# Patient Record
Sex: MALE | Race: WHITE | Employment: OTHER | URBAN - METROPOLITAN AREA
[De-identification: names, ages, dates, MRNs, and addresses within clinical notes are randomized per-mention and may not be internally consistent; named-entity substitution may affect disease eponyms.]

---

## 2020-01-10 PROBLEM — R09.02 HYPOXIA: Status: ACTIVE | Noted: 2020-01-10

## 2023-10-06 ENCOUNTER — PREP FOR PROCEDURE (OUTPATIENT)
Dept: DENTISTRY | Age: 53
End: 2023-10-06

## 2023-10-06 RX ORDER — SODIUM CHLORIDE 9 MG/ML
INJECTION, SOLUTION INTRAVENOUS PRN
Status: CANCELLED | OUTPATIENT
Start: 2023-10-06

## 2023-10-06 RX ORDER — SODIUM CHLORIDE 0.9 % (FLUSH) 0.9 %
5-40 SYRINGE (ML) INJECTION PRN
Status: CANCELLED | OUTPATIENT
Start: 2023-10-06

## 2023-10-06 RX ORDER — SODIUM CHLORIDE, SODIUM LACTATE, POTASSIUM CHLORIDE, CALCIUM CHLORIDE 600; 310; 30; 20 MG/100ML; MG/100ML; MG/100ML; MG/100ML
INJECTION, SOLUTION INTRAVENOUS CONTINUOUS
Status: CANCELLED | OUTPATIENT
Start: 2023-10-06

## 2023-10-06 RX ORDER — SODIUM CHLORIDE 0.9 % (FLUSH) 0.9 %
5-40 SYRINGE (ML) INJECTION EVERY 12 HOURS SCHEDULED
Status: CANCELLED | OUTPATIENT
Start: 2023-10-06

## 2023-10-19 NOTE — PROGRESS NOTES
I called Romana Edwards, she provided her email for me to send the order for the EKG to be done. She states that [employer] comes to the house. I explained we need the actual tracing, not only a written report. Romana Edwards email: [email or web address]    I left a voicemail with Dr. Sofia Cerda- he did not provide instructions for Eliquis and surgery 11/3.

## 2023-10-23 ENCOUNTER — LAB REQUISITION (OUTPATIENT)
Dept: LAB | Facility: HOSPITAL | Age: 53
End: 2023-10-23
Payer: MEDICARE

## 2023-10-23 DIAGNOSIS — K76.9 LIVER DISEASE, UNSPECIFIED: ICD-10-CM

## 2023-10-23 DIAGNOSIS — E72.20 DISORDER OF UREA CYCLE METABOLISM, UNSPECIFIED (MULTI): ICD-10-CM

## 2023-10-23 DIAGNOSIS — R73.09 OTHER ABNORMAL GLUCOSE: ICD-10-CM

## 2023-10-23 DIAGNOSIS — Z51.81 ENCOUNTER FOR THERAPEUTIC DRUG LEVEL MONITORING: ICD-10-CM

## 2023-10-23 DIAGNOSIS — Z79.899 OTHER LONG TERM (CURRENT) DRUG THERAPY: ICD-10-CM

## 2023-10-23 LAB
ALBUMIN SERPL BCP-MCNC: 3.7 G/DL (ref 3.4–5)
ALP SERPL-CCNC: 76 U/L (ref 33–120)
ALT SERPL W P-5'-P-CCNC: 16 U/L (ref 10–52)
AMMONIA PLAS-SCNC: 91 UMOL/L (ref 16–53)
AST SERPL W P-5'-P-CCNC: 15 U/L (ref 9–39)
BILIRUB DIRECT SERPL-MCNC: 0.1 MG/DL (ref 0–0.3)
BILIRUB SERPL-MCNC: 0.2 MG/DL (ref 0–1.2)
EST. AVERAGE GLUCOSE BLD GHB EST-MCNC: 143 MG/DL
HBA1C MFR BLD: 6.6 %
PROT SERPL-MCNC: 6.2 G/DL (ref 6.4–8.2)
VALPROATE SERPL-MCNC: 78 UG/ML (ref 50–100)

## 2023-10-23 PROCEDURE — 82140 ASSAY OF AMMONIA: CPT | Mod: OUT | Performed by: INTERNAL MEDICINE

## 2023-10-23 PROCEDURE — 83036 HEMOGLOBIN GLYCOSYLATED A1C: CPT | Mod: OUT | Performed by: INTERNAL MEDICINE

## 2023-10-23 PROCEDURE — 80076 HEPATIC FUNCTION PANEL: CPT | Mod: OUT | Performed by: INTERNAL MEDICINE

## 2023-10-23 PROCEDURE — 80164 ASSAY DIPROPYLACETIC ACD TOT: CPT | Mod: OUT | Performed by: INTERNAL MEDICINE

## 2023-10-31 RX ORDER — CLOTRIMAZOLE 1 %
CREAM (GRAM) TOPICAL 3 TIMES DAILY
COMMUNITY

## 2023-10-31 RX ORDER — LACTULOSE 10 G/15ML
30 SOLUTION ORAL; RECTAL 2 TIMES DAILY
COMMUNITY

## 2023-10-31 RX ORDER — HYDROXYZINE PAMOATE 50 MG/1
50 CAPSULE ORAL NIGHTLY
COMMUNITY

## 2023-10-31 RX ORDER — INSULIN GLARGINE 100 [IU]/ML
12 INJECTION, SOLUTION SUBCUTANEOUS DAILY
COMMUNITY

## 2023-10-31 RX ORDER — LEVOTHYROXINE SODIUM 0.1 MG/1
100 TABLET ORAL DAILY
COMMUNITY

## 2023-11-01 NOTE — H&P (VIEW-ONLY)
Dental History and Physical    Alfonse Cockayne    [address] [place] [address] Campos Johnson    The patient is a 48 y.o. male     Chief Complaint: Severe attrition and sharp teeth. 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[address])     Deaf     Diabetes type 2, controlled ([address])     Diastolic heart failure ([address])     Headache     Hypertension     Moderate intellectual disability     Nonverbal     Obesity     Phobia of dental procedure     Sexual behavior disorder     Shingles     Sinus problem     Sleep apnea     refuses to wear bi-pap    Snoring        Past Surgical History:        Procedure Laterality Date    NASAL POLYP SURGERY      OTHER SURGICAL HISTORY  01/10/2020    restorative dentistry        Medications Prior to Admission:    Prior to Admission medications    Medication Sig Start Date End Date Taking?  Authorizing Provider   clotrimazole (LOTRIMIN) 1 % cream Apply topically 3 times daily FEET    Loreta Parker MD   hydrOXYzine pamoate (VISTARIL) 50 MG capsule Take 1 capsule by mouth nightly    Loreta Parker MD   empagliflozin (JARDIANCE) 10 MG tablet Take 1 tablet by mouth daily    Loreta Parker MD   lactulose encephalopathy 10 GM/15ML SOLN solution Take 45 mLs by mouth 2 times daily    Loreta Parker MD   insulin glargine (LANTUS) 100 UNIT/ML injection vial Inject 12 Units into the skin daily    Loreta Parker MD   levothyroxine (SYNTHROID) 100 MCG tablet Take 1 tablet by mouth Daily    Loreta Parker MD   carbamide peroxide (DEBROX) 6.5 % otic solution Place 2 drops into both ears as needed    Loreta Parker MD MD Loreta   vitamin B-12 (CYANOCOBALAMIN) 1000 MCG tablet Take 1,000 mcg by mouth daily    Loreta Parker MD   Ergocalciferol (VITAMIN D2 PO) Take 50,000 Units by mouth Indications: every thursday    Loreta Parker MD   acetaminophen (TYLENOL) 650 MG extended release tablet Take 650 mg by mouth every 4 hours as needed for Pain or Fever    Loreta Parker MD   polyvinyl alcohol (LIQUIFILM TEARS) 1.4 % ophthalmic solution Place 1 drop into both eyes as needed    Loreta Parker MD   bisacodyl (DULCOLAX) 10 MG suppository Place 10 mg rectally Indications: every four days prn    Loreta Parker MD   Sodium Phosphates (FLEET) 7-19 GM/118ML Place 1 enema rectally once as needed    Loreta Parker MD   fluticasone (FLONASE) 50 MCG/ACT nasal spray 1 spray by Each Nostril route 2 times daily as needed for Rhinitis    Loreta Parker MD   ipratropium-albuterol (DUONEB) 0.5-2.5 (3) MG/3ML SOLN nebulizer solution Inhale 1 vial into the lungs every 6 hours as needed for Shortness of Breath    Loreta Parker MD   aluminum & magnesium hydroxide-simethicone (MAALOX) 200-200-20 MG/5ML SUSP suspension Take 30 mLs by mouth 2 times daily as needed for Indigestion    ProviderLoreta MD       Allergies:  Patient has no known allergies. Social History:   TOBACCO:   has no history on file for tobacco use. ETOH:   has no history on file for alcohol use. OCCUPATION:  unknown    Family History:   No family history on file.     REVIEW OF SYSTEMS:  Completed by Dr Nacho Pierre on 10/17/2023    Labs and Imaging Studies   Basic Labs  CBC with Differential:    Lab Results   Component Value Date/Time    WBC 9.4 01/11/2020 06:32 AM    RBC 4.35 01/11/2020 06:32 AM    HGB 14.9 01/11/2020 06:32 AM    HCT 44.1 01/11/2020 06:32 AM     01/11/2020 06:32 AM    .4 01/11/2020 06:32 AM    MCH 34.2 01/11/2020 06:32 AM    MCHC 33.7 01/11/2020 06:32 AM    RDW 14.0 01/11/2020 06:32 AM

## 2023-11-01 NOTE — PROGRESS NOTES
[address]   PRE-ADMISSION TESTING GENERAL INSTRUCTIONS  [facility] Phone Number: [phone]      GENERAL INSTRUCTIONS: DENTAL RESTORATIONS    [x] Antibacterial Soap Shower Night before and/or AM of surgery. [x] Do not wear makeup, lotions, powders, deodorant. [x] Nothing by mouth after midnight; including gum, candy, mints, or water. [x] You may brush your teeth, gargle, but do NOT swallow water. [x] No tobacco products, illegal drugs, or alcohol within 24 hours of your surgery. [x] Jewelry or valuables should not be brought to the hospital. All body and/or tongue piercing's must be removed prior to arriving to hospital. No contact lens or hair pins. [x] Bring insurance card and photo ID. PARKING INSTRUCTIONS:     [x] ARRIVAL DATE & TIME: NOV 3 RD AT 6 AM  [x] Times are subject to change. We will contact you the business day before surgery if that were to occur. [x] Enter into the [employer]. Two people may accompany you. Masks are not required. [x] Parking Lot \"I\" is where you will park. It is located on the corner of [address] [place] [address] and [address]. The entrance is on [address]. Only one vehicle - per patient, is permitted in parking lot. Upon entering the parking lot, a voucher ticket will print. MEDICATION INSTRUCTIONS:    [x] Bring a complete list of your medications, please write the last time you took the medicine, give this list to the nurse in Pre-Op. [x] Take only the following medications the morning of surgery with 1-2 ounces of water: DEPAKOTE, TOPAMAX, ABILIFY, THIORIDAZINE    HOLD JARDIANCE, LAST DOSE 10/31  [x] Stop all herbal supplements and vitamins 5 days before surgery. Stop NSAIDS 7 days before surgery. [x] DO NOT take any diabetic medicine the morning of surgery. Follow instructions for insulin the day before surgery.   [x] If you are diabetic and your blood sugar is low or you feel symptomatic, you may drink 1-2 ounces of apple juice or take a glucose tablet.            -The morning of your procedure, you may call the pre-op area if you have concerns about your blood sugar [phone]. [x] Follow physician instructions regarding any blood thinners you may be taking. WHAT TO EXPECT:    [x] The day of surgery you will be greeted and checked in by the [employer].  In addition, you will be registered in the [facility] by a Patient Access Representative. Please bring your photo ID and insurance card. A nurse will greet you in accordance to the time you are needed in the pre-op area to prepare you for surgery. Please do not be discouraged if you are not greeted in the order you arrive as there are many variables that are involved in patient preparation. Your patience is greatly appreciated as you wait for your nurse. [x]  Delays may occur with surgery and staff will make a sincere effort to keep you informed of delays. If any delays occur with your procedure, we apologize ahead of time for your inconvenience as we recognize the value of your time.

## 2023-11-01 NOTE — H&P
Dental History and Physical    [facility]    [address] [place] [address] Campos Johnson    The patient is a 48 y.o. male     Chief Complaint: Severe attrition and sharp teeth. 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[address])     Deaf     Diabetes type 2, controlled ([address])     Diastolic heart failure ([address])     Headache     Hypertension     Moderate intellectual disability     Nonverbal     Obesity     Phobia of dental procedure     Sexual behavior disorder     Shingles     Sinus problem     Sleep apnea     refuses to wear bi-pap    Snoring        Past Surgical History:        Procedure Laterality Date    NASAL POLYP SURGERY      OTHER SURGICAL HISTORY  01/10/2020    restorative dentistry        Medications Prior to Admission:    Prior to Admission medications    Medication Sig Start Date End Date Taking?  Authorizing Provider   clotrimazole (LOTRIMIN) 1 % cream Apply topically 3 times daily FEET    ProviderLoreta MD   hydrOXYzine pamoate (VISTARIL) 50 MG capsule Take 1 capsule by mouth nightly    Loreta Parker MD   empagliflozin (JARDIANCE) 10 MG tablet Take 1 tablet by mouth daily    Loreta Parker MD   lactulose encephalopathy 10 GM/15ML SOLN solution Take 45 mLs by mouth 2 times daily    Loreta Parker MD   insulin glargine (LANTUS) 100 UNIT/ML injection vial Inject 12 Units into the skin daily    Loreta Parker MD   levothyroxine (SYNTHROID) 100 MCG tablet Take 1 tablet by mouth Daily    Loreta Parker MD   carbamide peroxide (DEBROX) 6.5 % otic solution Place 2 drops into both ears as needed    Loreta Parker MD

## 2023-11-03 ENCOUNTER — ANESTHESIA EVENT (OUTPATIENT)
Dept: OPERATING ROOM | Age: 53
End: 2023-11-03
Payer: MEDICARE

## 2023-11-03 ENCOUNTER — ANESTHESIA (OUTPATIENT)
Dept: OPERATING ROOM | Age: 53
End: 2023-11-03
Payer: MEDICARE

## 2023-11-03 ENCOUNTER — HOSPITAL ENCOUNTER (OUTPATIENT)
Age: 53
Setting detail: OUTPATIENT SURGERY
Discharge: HOME | End: 2023-11-03
Attending: DENTIST | Admitting: DENTIST
Payer: MEDICARE

## 2023-11-03 VITALS
OXYGEN SATURATION: 93 % | SYSTOLIC BLOOD PRESSURE: 127 MMHG | DIASTOLIC BLOOD PRESSURE: 63 MMHG | HEIGHT: 66 IN | BODY MASS INDEX: 30.37 KG/M2 | TEMPERATURE: 97.1 F | RESPIRATION RATE: 10 BRPM | HEART RATE: 79 BPM | WEIGHT: 189 LBS

## 2023-11-03 DIAGNOSIS — Z01.818 PRE-OP TESTING: Primary | ICD-10-CM

## 2023-11-03 PROBLEM — K05.6 PERIODONTAL DISEASE: Status: ACTIVE | Noted: 2023-11-03

## 2023-11-03 LAB
ANION GAP SERPL CALCULATED.3IONS-SCNC: 10 MMOL/L (ref 7–16)
BUN SERPL-MCNC: 11 MG/DL (ref 6–20)
CALCIUM SERPL-MCNC: 9.4 MG/DL (ref 8.6–10.2)
CHLORIDE SERPL-SCNC: 106 MMOL/L (ref 98–107)
CO2 SERPL-SCNC: 29 MMOL/L (ref 22–29)
CREAT SERPL-MCNC: 0.6 MG/DL (ref 0.7–1.2)
ERYTHROCYTE [DISTWIDTH] IN BLOOD BY AUTOMATED COUNT: 15.1 % (ref 11.5–15)
GFR SERPL CREATININE-BSD FRML MDRD: >60 ML/MIN/1.73M2
GLUCOSE BLD-MCNC: 94 MG/DL (ref 74–99)
GLUCOSE SERPL-MCNC: 90 MG/DL (ref 74–99)
HCT VFR BLD AUTO: 45.4 % (ref 37–54)
HGB BLD-MCNC: 14.4 G/DL (ref 12.5–16.5)
MCH RBC QN AUTO: 30.8 PG (ref 26–35)
MCHC RBC AUTO-ENTMCNC: 31.7 G/DL (ref 32–34.5)
MCV RBC AUTO: 97 FL (ref 80–99.9)
PLATELET # BLD AUTO: 191 K/UL (ref 130–450)
PMV BLD AUTO: 10.4 FL (ref 7–12)
POTASSIUM SERPL-SCNC: 3.8 MMOL/L (ref 3.5–5)
RBC # BLD AUTO: 4.68 M/UL (ref 3.8–5.8)
SODIUM SERPL-SCNC: 145 MMOL/L (ref 132–146)
WBC OTHER # BLD: 10.1 K/UL (ref 4.5–11.5)

## 2023-11-03 PROCEDURE — 3700000001 HC ADD 15 MINUTES (ANESTHESIA): Performed by: DENTIST

## 2023-11-03 PROCEDURE — 80048 BASIC METABOLIC PNL TOTAL CA: CPT

## 2023-11-03 PROCEDURE — 82962 GLUCOSE BLOOD TEST: CPT

## 2023-11-03 PROCEDURE — 7100000001 HC PACU RECOVERY - ADDTL 15 MIN: Performed by: DENTIST

## 2023-11-03 PROCEDURE — 2580000003 HC RX 258: Performed by: DENTIST

## 2023-11-03 PROCEDURE — 85027 COMPLETE CBC AUTOMATED: CPT

## 2023-11-03 PROCEDURE — 2500000003 HC RX 250 WO HCPCS: Performed by: ANESTHESIOLOGIST ASSISTANT

## 2023-11-03 PROCEDURE — 7100000010 HC PHASE II RECOVERY - FIRST 15 MIN: Performed by: DENTIST

## 2023-11-03 PROCEDURE — 6370000000 HC RX 637 (ALT 250 FOR IP): Performed by: DENTIST

## 2023-11-03 PROCEDURE — 2500000003 HC RX 250 WO HCPCS: Performed by: DENTIST

## 2023-11-03 PROCEDURE — 7100000011 HC PHASE II RECOVERY - ADDTL 15 MIN: Performed by: DENTIST

## 2023-11-03 PROCEDURE — 3700000000 HC ANESTHESIA ATTENDED CARE: Performed by: DENTIST

## 2023-11-03 PROCEDURE — 2709999900 HC NON-CHARGEABLE SUPPLY: Performed by: DENTIST

## 2023-11-03 PROCEDURE — 3600000013 HC SURGERY LEVEL 3 ADDTL 15MIN: Performed by: DENTIST

## 2023-11-03 PROCEDURE — 7100000000 HC PACU RECOVERY - FIRST 15 MIN: Performed by: DENTIST

## 2023-11-03 PROCEDURE — 3600000003 HC SURGERY LEVEL 3 BASE: Performed by: DENTIST

## 2023-11-03 PROCEDURE — 6370000000 HC RX 637 (ALT 250 FOR IP): Performed by: ANESTHESIOLOGIST ASSISTANT

## 2023-11-03 PROCEDURE — 6360000002 HC RX W HCPCS: Performed by: ANESTHESIOLOGIST ASSISTANT

## 2023-11-03 RX ORDER — LIDOCAINE HYDROCHLORIDE 20 MG/ML
INJECTION, SOLUTION INTRAVENOUS PRN
Status: DISCONTINUED | OUTPATIENT
Start: 2023-11-03 | End: 2023-11-03 | Stop reason: SDUPTHER

## 2023-11-03 RX ORDER — DEXAMETHASONE SODIUM PHOSPHATE 10 MG/ML
INJECTION INTRAMUSCULAR; INTRAVENOUS PRN
Status: DISCONTINUED | OUTPATIENT
Start: 2023-11-03 | End: 2023-11-03 | Stop reason: SDUPTHER

## 2023-11-03 RX ORDER — ONDANSETRON 2 MG/ML
4 INJECTION INTRAMUSCULAR; INTRAVENOUS
Status: DISCONTINUED | OUTPATIENT
Start: 2023-11-03 | End: 2023-11-03 | Stop reason: HOSPADM

## 2023-11-03 RX ORDER — MIDAZOLAM HYDROCHLORIDE 1 MG/ML
INJECTION INTRAMUSCULAR; INTRAVENOUS PRN
Status: DISCONTINUED | OUTPATIENT
Start: 2023-11-03 | End: 2023-11-03 | Stop reason: SDUPTHER

## 2023-11-03 RX ORDER — SODIUM CHLORIDE 0.9 % (FLUSH) 0.9 %
5-40 SYRINGE (ML) INJECTION EVERY 12 HOURS SCHEDULED
Status: DISCONTINUED | OUTPATIENT
Start: 2023-11-03 | End: 2023-11-03 | Stop reason: HOSPADM

## 2023-11-03 RX ORDER — ALBUTEROL SULFATE 90 UG/1
AEROSOL, METERED RESPIRATORY (INHALATION) PRN
Status: DISCONTINUED | OUTPATIENT
Start: 2023-11-03 | End: 2023-11-03 | Stop reason: SDUPTHER

## 2023-11-03 RX ORDER — HYDROMORPHONE HYDROCHLORIDE 1 MG/ML
0.25 INJECTION, SOLUTION INTRAMUSCULAR; INTRAVENOUS; SUBCUTANEOUS EVERY 5 MIN PRN
Status: DISCONTINUED | OUTPATIENT
Start: 2023-11-03 | End: 2023-11-03 | Stop reason: HOSPADM

## 2023-11-03 RX ORDER — ACETAMINOPHEN 325 MG/1
650 TABLET ORAL
Status: DISCONTINUED | OUTPATIENT
Start: 2023-11-03 | End: 2023-11-03 | Stop reason: HOSPADM

## 2023-11-03 RX ORDER — AMOXICILLIN 500 MG/1
500 CAPSULE ORAL 3 TIMES DAILY
Qty: 21 CAPSULE | Refills: 0 | Status: SHIPPED | OUTPATIENT
Start: 2023-11-03 | End: 2023-11-10

## 2023-11-03 RX ORDER — LABETALOL HYDROCHLORIDE 5 MG/ML
5 INJECTION, SOLUTION INTRAVENOUS
Status: DISCONTINUED | OUTPATIENT
Start: 2023-11-03 | End: 2023-11-03 | Stop reason: HOSPADM

## 2023-11-03 RX ORDER — HYDRALAZINE HYDROCHLORIDE 20 MG/ML
5 INJECTION INTRAMUSCULAR; INTRAVENOUS
Status: DISCONTINUED | OUTPATIENT
Start: 2023-11-03 | End: 2023-11-03 | Stop reason: HOSPADM

## 2023-11-03 RX ORDER — MEPERIDINE HYDROCHLORIDE 25 MG/ML
12.5 INJECTION INTRAMUSCULAR; INTRAVENOUS; SUBCUTANEOUS EVERY 5 MIN PRN
Status: DISCONTINUED | OUTPATIENT
Start: 2023-11-03 | End: 2023-11-03 | Stop reason: HOSPADM

## 2023-11-03 RX ORDER — IPRATROPIUM BROMIDE AND ALBUTEROL SULFATE 2.5; .5 MG/3ML; MG/3ML
1 SOLUTION RESPIRATORY (INHALATION)
Status: DISCONTINUED | OUTPATIENT
Start: 2023-11-03 | End: 2023-11-03 | Stop reason: HOSPADM

## 2023-11-03 RX ORDER — SODIUM CHLORIDE 9 MG/ML
INJECTION, SOLUTION INTRAVENOUS PRN
Status: DISCONTINUED | OUTPATIENT
Start: 2023-11-03 | End: 2023-11-03 | Stop reason: HOSPADM

## 2023-11-03 RX ORDER — SODIUM CHLORIDE 0.9 % (FLUSH) 0.9 %
5-40 SYRINGE (ML) INJECTION PRN
Status: DISCONTINUED | OUTPATIENT
Start: 2023-11-03 | End: 2023-11-03 | Stop reason: HOSPADM

## 2023-11-03 RX ORDER — SODIUM CHLORIDE, SODIUM LACTATE, POTASSIUM CHLORIDE, CALCIUM CHLORIDE 600; 310; 30; 20 MG/100ML; MG/100ML; MG/100ML; MG/100ML
INJECTION, SOLUTION INTRAVENOUS CONTINUOUS
Status: DISCONTINUED | OUTPATIENT
Start: 2023-11-03 | End: 2023-11-03 | Stop reason: HOSPADM

## 2023-11-03 RX ORDER — LIDOCAINE HYDROCHLORIDE AND EPINEPHRINE BITARTRATE 20; .01 MG/ML; MG/ML
INJECTION, SOLUTION SUBCUTANEOUS PRN
Status: DISCONTINUED | OUTPATIENT
Start: 2023-11-03 | End: 2023-11-03 | Stop reason: HOSPADM

## 2023-11-03 RX ORDER — ONDANSETRON 2 MG/ML
INJECTION INTRAMUSCULAR; INTRAVENOUS PRN
Status: DISCONTINUED | OUTPATIENT
Start: 2023-11-03 | End: 2023-11-03 | Stop reason: SDUPTHER

## 2023-11-03 RX ORDER — MIDAZOLAM HYDROCHLORIDE 2 MG/2ML
2 INJECTION, SOLUTION INTRAMUSCULAR; INTRAVENOUS
Status: DISCONTINUED | OUTPATIENT
Start: 2023-11-03 | End: 2023-11-03 | Stop reason: HOSPADM

## 2023-11-03 RX ORDER — ROCURONIUM BROMIDE 10 MG/ML
INJECTION, SOLUTION INTRAVENOUS PRN
Status: DISCONTINUED | OUTPATIENT
Start: 2023-11-03 | End: 2023-11-03 | Stop reason: SDUPTHER

## 2023-11-03 RX ORDER — FENTANYL CITRATE 50 UG/ML
INJECTION, SOLUTION INTRAMUSCULAR; INTRAVENOUS PRN
Status: DISCONTINUED | OUTPATIENT
Start: 2023-11-03 | End: 2023-11-03 | Stop reason: SDUPTHER

## 2023-11-03 RX ORDER — HYDROMORPHONE HYDROCHLORIDE 1 MG/ML
0.5 INJECTION, SOLUTION INTRAMUSCULAR; INTRAVENOUS; SUBCUTANEOUS EVERY 5 MIN PRN
Status: DISCONTINUED | OUTPATIENT
Start: 2023-11-03 | End: 2023-11-03 | Stop reason: HOSPADM

## 2023-11-03 RX ORDER — PROPOFOL 10 MG/ML
INJECTION, EMULSION INTRAVENOUS PRN
Status: DISCONTINUED | OUTPATIENT
Start: 2023-11-03 | End: 2023-11-03 | Stop reason: SDUPTHER

## 2023-11-03 RX ORDER — DROPERIDOL 2.5 MG/ML
0.62 INJECTION, SOLUTION INTRAMUSCULAR; INTRAVENOUS
Status: DISCONTINUED | OUTPATIENT
Start: 2023-11-03 | End: 2023-11-03 | Stop reason: HOSPADM

## 2023-11-03 RX ORDER — PHENYLEPHRINE HCL IN 0.9% NACL 1 MG/10 ML
SYRINGE (ML) INTRAVENOUS PRN
Status: DISCONTINUED | OUTPATIENT
Start: 2023-11-03 | End: 2023-11-03 | Stop reason: SDUPTHER

## 2023-11-03 RX ORDER — DIPHENHYDRAMINE HYDROCHLORIDE 50 MG/ML
12.5 INJECTION INTRAMUSCULAR; INTRAVENOUS
Status: DISCONTINUED | OUTPATIENT
Start: 2023-11-03 | End: 2023-11-03 | Stop reason: HOSPADM

## 2023-11-03 RX ORDER — GLYCOPYRROLATE 0.2 MG/ML
INJECTION INTRAMUSCULAR; INTRAVENOUS PRN
Status: DISCONTINUED | OUTPATIENT
Start: 2023-11-03 | End: 2023-11-03 | Stop reason: SDUPTHER

## 2023-11-03 RX ADMIN — GLYCOPYRROLATE 0.2 MG: 1 INJECTION INTRAMUSCULAR; INTRAVENOUS at 08:18

## 2023-11-03 RX ADMIN — SODIUM CHLORIDE: 9 INJECTION, SOLUTION INTRAVENOUS at 08:59

## 2023-11-03 RX ADMIN — ALBUTEROL SULFATE 4 PUFF: 90 AEROSOL, METERED RESPIRATORY (INHALATION) at 09:15

## 2023-11-03 RX ADMIN — PROPOFOL 200 MG: 10 INJECTION, EMULSION INTRAVENOUS at 07:37

## 2023-11-03 RX ADMIN — Medication 100 MCG: at 07:41

## 2023-11-03 RX ADMIN — MIDAZOLAM 1 MG: 1 INJECTION INTRAMUSCULAR; INTRAVENOUS at 07:33

## 2023-11-03 RX ADMIN — DEXAMETHASONE SODIUM PHOSPHATE 10 MG: 10 INJECTION INTRAMUSCULAR; INTRAVENOUS at 07:42

## 2023-11-03 RX ADMIN — ROCURONIUM BROMIDE 50 MG: 10 INJECTION, SOLUTION INTRAVENOUS at 07:37

## 2023-11-03 RX ADMIN — Medication 100 MCG: at 08:18

## 2023-11-03 RX ADMIN — SODIUM CHLORIDE: 9 INJECTION, SOLUTION INTRAVENOUS at 07:27

## 2023-11-03 RX ADMIN — PROPOFOL 50 MG: 10 INJECTION, EMULSION INTRAVENOUS at 08:38

## 2023-11-03 RX ADMIN — FENTANYL CITRATE 100 MCG: 50 INJECTION, SOLUTION INTRAMUSCULAR; INTRAVENOUS at 07:37

## 2023-11-03 RX ADMIN — LIDOCAINE HYDROCHLORIDE 100 MG: 20 INJECTION, SOLUTION INTRAVENOUS at 07:37

## 2023-11-03 RX ADMIN — ONDANSETRON HYDROCHLORIDE 4 MG: 2 SOLUTION INTRAMUSCULAR; INTRAVENOUS at 09:09

## 2023-11-03 RX ADMIN — Medication 100 MCG: at 07:55

## 2023-11-03 RX ADMIN — SUGAMMADEX 172 MG: 100 INJECTION, SOLUTION INTRAVENOUS at 08:58

## 2023-11-03 ASSESSMENT — PAIN - FUNCTIONAL ASSESSMENT: PAIN_FUNCTIONAL_ASSESSMENT: 0-10

## 2023-11-03 NOTE — BRIEF OP NOTE
Brief Postoperative Note      Patient: Bushra Bobo  YOB: 1970  MRN: [id number]    Date of Procedure: 11/3/2023    Pre-Op Diagnosis Codes:     * Dental caries [K02.9]    Post-Op Diagnosis: Same       Procedure(s):  RADIOGRAPHS, ADULT PROPHYLAXIS, FLUORIDE, RESTORATIONS, EXTRACTIONS X3    Surgeon(s):  Salma Maciel DDS; Radha Shepherd DMD    Assistant:      Anesthesia: General ETT    Estimated Blood Loss (mL): less than 50     Complications: None    Specimens:   * No specimens in log *    Implants:  * No implants in log *      Drains: * No LDAs found *    Findings: Clinical and radiographic exam completed. 3 restorations and 3 extractions indicated. Electronically signed by Radha Shepherd DMD on 11/3/2023 at 9:07 AM    I agree with the above.  Electronically signed by Salma Maciel DDS on 11/3/2023 at 1:55 PM

## 2023-11-03 NOTE — ANESTHESIA POSTPROCEDURE EVALUATION
Department of Anesthesiology  Postprocedure Note    Patient: Elinor Kawasaki  MRN: [id number]  YOB: 1970  Date of evaluation: 11/3/2023      Procedure Summary     Date: 11/03/23 Room / Location: [facility] OR 10 / [facility]    Anesthesia Start: 6487 Anesthesia Stop: 7920    Procedure: RADIOGRAPHS, ADULT PROPHYLAXIS, FLUORIDE, RESTORATIONS, EXTRACTIONS X3 (Mouth) Diagnosis:       Dental caries      (Dental caries [K02.9])    Surgeons: Kevyn Jasso DDS Responsible Provider: Sisi Crook MD    Anesthesia Type: general ASA Status: 3          Anesthesia Type: No value filed.     Que Phase I: Que Score: 10    Que Phase II: Que Score: 10      Anesthesia Post Evaluation    Patient location during evaluation: PACU  Patient participation: complete - patient participated  Level of consciousness: awake and alert  Airway patency: patent  Nausea & Vomiting: no nausea and no vomiting  Complications: no  Cardiovascular status: blood pressure returned to baseline and hemodynamically stable  Respiratory status: acceptable and spontaneous ventilation  Hydration status: euvolemic  Multimodal analgesia pain management approach  Pain management: adequate

## 2023-11-03 NOTE — PROGRESS NOTES
Care giver dressing patient. Instructions given to caregiver along with script to take to the pharmacy.

## 2023-11-03 NOTE — ANESTHESIA PRE PROCEDURE
Department of Anesthesiology  Preprocedure Note       Name:  Alfonse Cockayne   Age:  48 y.o.  :  1970                                          MRN:  [id number]         Date:  11/3/2023      Surgeon: Gregg Ribera):  Anirudh Golden DDS    Procedure: Procedure(s):  DENTAL RESTORATIONS (FACILITY)    Medications prior to admission:   Prior to Admission medications    Medication Sig Start Date End Date Taking? Authorizing Provider   clotrimazole (LOTRIMIN) 1 % cream Apply topically 3 times daily FEET   Yes Loreta Parker MD   hydrOXYzine pamoate (VISTARIL) 50 MG capsule Take 1 capsule by mouth nightly   Yes Loreta Parker MD   empagliflozin (JARDIANCE) 10 MG tablet Take 1 tablet by mouth daily   Yes Loreta Parker MD   lactulose encephalopathy 10 GM/15ML SOLN solution Take 45 mLs by mouth 2 times daily   Yes Loreta Parker MD   insulin glargine (LANTUS) 100 UNIT/ML injection vial Inject 12 Units into the skin daily   Yes Loreta Parker MD   levothyroxine (SYNTHROID) 100 MCG tablet Take 1 tablet by mouth Daily   Yes Loreta Parker MD   carbamide peroxide (DEBROX) 6.5 % otic solution Place 2 drops into both ears as needed   Yes Loreta Parker MD   magnesium hydroxide (MILK OF MAGNESIA) 400 MG/5ML suspension Take by mouth every 48 hours as needed for Constipation   Yes Loreta Parker MD   neomycin-bacitracin-polymyxin (NEOSPORIN) 5-400-5000 ointment Apply topically 2 times daily as needed Apply topically 4 times daily.     Loreta Parker MD   Vitamins A & D ([employer] VITAMIN A&D) OINT Apply topically daily as needed    Loreta Parker MD   ARIPiprazole (ABILIFY) 30 MG tablet Take 0.5 tablets by mouth 2 times daily Indications: takes half tablet    Loreta Parker MD   atorvastatin (LIPITOR) 80 MG tablet Take 1 tablet by mouth nightly    Loreta Parker MD   divalproex (DEPAKOTE ER) 250 MG extended release tablet Take 3 tablets by mouth in the

## 2023-11-03 NOTE — DISCHARGE INSTRUCTIONS
Post- Operative Patient  Instructions for [facility]     Please read and follow these instructions carefully. The after effects of oral surgery vary per child, so not all of these instructions may apply. Please feel free to call our clinic any time should you have any questions, or are experiencing any unusual symptoms following your treatment. There is always a dental resident on call after hours that you may reach to discuss your child's care.                                                            Day of Surgery    Immediately after surgery.Patients that have received a general anesthetic should return home from the hospital immediately upon discharge, and lie down with head elevated until all effects of the anesthesia have disappeared. Anesthetic effects vary by individual, and you may feel drowsy for a short period of time or for several hours. Your child should not participate in activities for at least 12 hours or longer if they appear drowsing or tired from the residual effects of the anesthesia.    Do not send your child to school within 24 hours after procedure.    Do not allow your child to participate in activities before 12 hours or longer depending on how your child is feeling.     Watch out for dizziness. Have them walk slowly and take their time. Sudden changes of position can also cause nausea.    Diet: If they feel nauseated or sick to your stomach, drink clear liquids like 7-up, room temperature broth, apple juice, ginger ale, room temperature tea, cola, or eat jello. If these liquids do not make you sick to their stomach, try eating soft foods like mashed potatoes, scrambled eggs, and cereal.    6)  Discuss any questions you may have with the dental clinic at [phone] during    office hours or [phone] on weekends and evening.                                                                                                      Oral Hygiene and Care    Do not disturb  especially if crowns were placed. Recurrent or New cavities may develop sooner after if the above directions are not followed and the diet and hygiene are not changed                                                                Follow Up   It is our desire that your recovery be as smooth as possible and as pleasant as possible.  If you have any questions about your progress or any symptoms, please call the dental clinic during office hours at [phone] or at the after hours number [phone].      1- Week Follow-up Appointment at the Dental Clinic:   11/10/2023 at [address] [facility] Vi Hsu, DMD

## 2023-11-03 NOTE — INTERVAL H&P NOTE
Update History & Physical    The patient's History and Physical of October 17, 2023 was reviewed with the patient and caregiver, and I examined the patient. There was no change. The surgical site was confirmed by the patient, caregiver, and me. Plan: The risks, benefits, expected outcome, and alternative to the recommended procedure have been discussed with the guardian. Consents obtained from guardian for procedure.    Electronically signed by Irvin Dasilva DMD on 11/3/2023 at 7:23 AM

## 2023-11-03 NOTE — OP NOTE
Date of Procedure: 11/3/2023     Surgeon: Ba Carter DDS; Sherry Lebron DMD      Surgical Wound Classification: Clean Contaminated II    Preoperative Diagnosis: dental caries      Postoperative Diagnosis: dental caries, periodontal disease    Operation: Exam, Prophy, Fluoride Treatment, Restorative:3 Extractions: 3    Anesthesia: General ETT    Estimated Blood Loss: less then 17UY    Complications:  none    Fluids: 1000 mL    Specimen: none    Conditions:  good    Disposition: To PACU, stable    Procedure: This patient was initially seen in the preoperative holding area, where the history, physical and consents were updated and verified. The patient was then transferred via the anesthesia team and [facility] to operating room # 10 at [facility] on 11/3/2023 , at which time the patient was transferred from the [facility] onto the operating room table and placed in the supine position. The patient's arms and legs were padded at the sides. The patient had the general anesthetic monitors applied. Please see anesthesia notes for complete details. Once the patient was placed into a general anesthetic state, the surgical area was prepped and draped in a standard oral and maxillofacial surgery fashion. A throat pack was placed. A comprehensive oral exam was performed. 12 radiographs were taken. A treatment plan was formulated based upon presenting clinical and radiographic findings. Caries were identified, followed by the preparation of the following teeth: #6 DI, #8DLM, #9 ML. Dental restorations were placed as follows: #6 DI, #8DLM, and #9 ML all composite resin restorations. Dental restorations were polished and refined to proper occlusion. Full mouth scaling and root planing completed with cavitron and hand scalers. Surgical procedure was initiated.   Using 1.5 length 27-gauge needle, and 3.4 mL of 2% lidocaine with 1:100,000 epinephrine was administered Utilizing proper surgical instruments and techniques, the following teeth were removed:  #14,15,27. Teeth were removed due to caries, fractures, or mobility supported by clinical and radiographic evidence. All teeth removed were non-restorable. Extraction sites were copiously irrigated with normal saline, and felt to be smooth by finger touch. Bovie used. Extractions sites were closed with 3.0 chromic on a tapered PS-2 needle. Hemostasis achieved. One final round of copious irrigation with suction was completed and it was verified again that primary closure was achieved in all areas. Throat pack was removed. Patient was awake from anesthesia. Patient was released to recovery room in good condition. Patient tolerated procedure well. Postoperative instructions given to caregiver. Due to state of periodontal disease, and multiple fractures, recommend 2 year recall for OR dental. At that appointment recommend extracting all 13 remaining teeth. The following prescriptions were given: (1) amoxicillin 500mg  No refills on either prescription. 1-Week Post Op:  11/10/2023 at 1pm    Written by: Anna Crenshaw DMD ,  for Wendy Disla DDS     I was present with the above resident and patient for pre-operative, procedure, and post-operative care. I agree with the above. Written and verbal post-op instructions given to patient's caregiver who verbalized her understanding. All questions addressed.  Electronically signed by Wendy Disla DDS on 11/3/2023 at 2:00 PM

## 2024-02-05 ENCOUNTER — LAB REQUISITION (OUTPATIENT)
Dept: LAB | Facility: HOSPITAL | Age: 54
End: 2024-02-05
Payer: MEDICARE

## 2024-02-05 DIAGNOSIS — Z51.81 ENCOUNTER FOR THERAPEUTIC DRUG LEVEL MONITORING: ICD-10-CM

## 2024-02-05 DIAGNOSIS — R94.6 ABNORMAL RESULTS OF THYROID FUNCTION STUDIES: ICD-10-CM

## 2024-02-05 DIAGNOSIS — R68.89 OTHER GENERAL SYMPTOMS AND SIGNS: ICD-10-CM

## 2024-02-05 DIAGNOSIS — E55.9 VITAMIN D DEFICIENCY, UNSPECIFIED: ICD-10-CM

## 2024-02-05 DIAGNOSIS — E78.5 HYPERLIPIDEMIA, UNSPECIFIED: ICD-10-CM

## 2024-02-05 DIAGNOSIS — R79.89 OTHER SPECIFIED ABNORMAL FINDINGS OF BLOOD CHEMISTRY: ICD-10-CM

## 2024-02-05 DIAGNOSIS — K76.9 LIVER DISEASE, UNSPECIFIED: ICD-10-CM

## 2024-02-05 DIAGNOSIS — R73.09 OTHER ABNORMAL GLUCOSE: ICD-10-CM

## 2024-02-05 LAB
25(OH)D3 SERPL-MCNC: 70 NG/ML (ref 30–100)
ALBUMIN SERPL BCP-MCNC: 4 G/DL (ref 3.4–5)
ALBUMIN SERPL BCP-MCNC: 4 G/DL (ref 3.4–5)
ALP SERPL-CCNC: 84 U/L (ref 33–120)
ALP SERPL-CCNC: 84 U/L (ref 33–120)
ALT SERPL W P-5'-P-CCNC: 17 U/L (ref 10–52)
ALT SERPL W P-5'-P-CCNC: 17 U/L (ref 10–52)
AMMONIA PLAS-SCNC: 62 UMOL/L (ref 16–53)
ANION GAP SERPL CALC-SCNC: 12 MMOL/L (ref 10–20)
AST SERPL W P-5'-P-CCNC: 17 U/L (ref 9–39)
AST SERPL W P-5'-P-CCNC: 17 U/L (ref 9–39)
BASOPHILS # BLD AUTO: 0.04 X10*3/UL (ref 0–0.1)
BASOPHILS NFR BLD AUTO: 0.6 %
BILIRUB DIRECT SERPL-MCNC: 0 MG/DL (ref 0–0.3)
BILIRUB SERPL-MCNC: 0.3 MG/DL (ref 0–1.2)
BILIRUB SERPL-MCNC: 0.3 MG/DL (ref 0–1.2)
BUN SERPL-MCNC: 12 MG/DL (ref 6–23)
CALCIUM SERPL-MCNC: 9 MG/DL (ref 8.6–10.3)
CHLORIDE SERPL-SCNC: 108 MMOL/L (ref 98–107)
CHOLEST SERPL-MCNC: 113 MG/DL (ref 0–199)
CHOLESTEROL/HDL RATIO: 3.7
CO2 SERPL-SCNC: 29 MMOL/L (ref 21–32)
CREAT SERPL-MCNC: 0.61 MG/DL (ref 0.5–1.3)
EGFRCR SERPLBLD CKD-EPI 2021: >90 ML/MIN/1.73M*2
EOSINOPHIL # BLD AUTO: 0.07 X10*3/UL (ref 0–0.7)
EOSINOPHIL NFR BLD AUTO: 1 %
ERYTHROCYTE [DISTWIDTH] IN BLOOD BY AUTOMATED COUNT: 15 % (ref 11.5–14.5)
EST. AVERAGE GLUCOSE BLD GHB EST-MCNC: 128 MG/DL
GLUCOSE SERPL-MCNC: 92 MG/DL (ref 74–99)
HBA1C MFR BLD: 6.1 %
HCT VFR BLD AUTO: 46.6 % (ref 41–52)
HDLC SERPL-MCNC: 30.5 MG/DL
HGB BLD-MCNC: 14.7 G/DL (ref 13.5–17.5)
IMM GRANULOCYTES # BLD AUTO: 0.04 X10*3/UL (ref 0–0.7)
IMM GRANULOCYTES NFR BLD AUTO: 0.6 % (ref 0–0.9)
LDLC SERPL CALC-MCNC: 48 MG/DL
LDLC SERPL DIRECT ASSAY-MCNC: 61 MG/DL (ref 0–129)
LYMPHOCYTES # BLD AUTO: 4.07 X10*3/UL (ref 1.2–4.8)
LYMPHOCYTES NFR BLD AUTO: 56.8 %
MCH RBC QN AUTO: 30.4 PG (ref 26–34)
MCHC RBC AUTO-ENTMCNC: 31.5 G/DL (ref 32–36)
MCV RBC AUTO: 96 FL (ref 80–100)
MONOCYTES # BLD AUTO: 0.67 X10*3/UL (ref 0.1–1)
MONOCYTES NFR BLD AUTO: 9.3 %
NEUTROPHILS # BLD AUTO: 2.28 X10*3/UL (ref 1.2–7.7)
NEUTROPHILS NFR BLD AUTO: 31.7 %
NON HDL CHOLESTEROL: 83 MG/DL (ref 0–149)
NRBC BLD-RTO: 0 /100 WBCS (ref 0–0)
PLATELET # BLD AUTO: 187 X10*3/UL (ref 150–450)
POTASSIUM SERPL-SCNC: 4.3 MMOL/L (ref 3.5–5.3)
PROT SERPL-MCNC: 6.6 G/DL (ref 6.4–8.2)
PROT SERPL-MCNC: 6.6 G/DL (ref 6.4–8.2)
RBC # BLD AUTO: 4.84 X10*6/UL (ref 4.5–5.9)
SODIUM SERPL-SCNC: 145 MMOL/L (ref 136–145)
TRIGL SERPL-MCNC: 172 MG/DL (ref 0–149)
TSH SERPL-ACNC: 5.72 MIU/L (ref 0.44–3.98)
VALPROATE SERPL-MCNC: 77 UG/ML (ref 50–100)
VLDL: 34 MG/DL (ref 0–40)
WBC # BLD AUTO: 7.2 X10*3/UL (ref 4.4–11.3)

## 2024-02-05 PROCEDURE — 84443 ASSAY THYROID STIM HORMONE: CPT | Mod: OUT | Performed by: INTERNAL MEDICINE

## 2024-02-05 PROCEDURE — 83036 HEMOGLOBIN GLYCOSYLATED A1C: CPT | Mod: OUT | Performed by: INTERNAL MEDICINE

## 2024-02-05 PROCEDURE — 80076 HEPATIC FUNCTION PANEL: CPT | Mod: CCI,OUT | Performed by: INTERNAL MEDICINE

## 2024-02-05 PROCEDURE — 83721 ASSAY OF BLOOD LIPOPROTEIN: CPT | Mod: OUT,PORLAB | Performed by: INTERNAL MEDICINE

## 2024-02-05 PROCEDURE — 85025 COMPLETE CBC W/AUTO DIFF WBC: CPT | Mod: OUT | Performed by: INTERNAL MEDICINE

## 2024-02-05 PROCEDURE — 82306 VITAMIN D 25 HYDROXY: CPT | Mod: OUT | Performed by: INTERNAL MEDICINE

## 2024-02-05 PROCEDURE — 80164 ASSAY DIPROPYLACETIC ACD TOT: CPT | Mod: OUT | Performed by: INTERNAL MEDICINE

## 2024-02-05 PROCEDURE — 80053 COMPREHEN METABOLIC PANEL: CPT | Mod: OUT | Performed by: INTERNAL MEDICINE

## 2024-02-05 PROCEDURE — 82140 ASSAY OF AMMONIA: CPT | Mod: OUT | Performed by: INTERNAL MEDICINE

## 2024-02-05 PROCEDURE — 80061 LIPID PANEL: CPT | Mod: OUT | Performed by: INTERNAL MEDICINE

## 2024-02-12 PROBLEM — G52.1 GLOSSOPHARYNGEAL NERVE DISORDER: Status: ACTIVE | Noted: 2024-02-12

## 2024-02-12 PROBLEM — I10 HYPERTENSION: Status: ACTIVE | Noted: 2024-02-12

## 2024-02-12 PROBLEM — G47.33 OBSTRUCTIVE SLEEP APNEA: Status: ACTIVE | Noted: 2024-02-12

## 2024-02-12 PROBLEM — E03.9 HYPOTHYROIDISM: Status: ACTIVE | Noted: 2024-02-12

## 2024-02-12 PROBLEM — R13.12 OROPHARYNGEAL DYSPHAGIA: Status: ACTIVE | Noted: 2024-02-12

## 2024-02-12 PROBLEM — H91.3 DEAF, NONSPEAKING: Status: ACTIVE | Noted: 2024-02-12

## 2024-02-12 PROBLEM — H91.93 BILATERAL HEARING LOSS: Status: ACTIVE | Noted: 2024-02-12

## 2024-02-12 PROBLEM — E11.9 TYPE 2 DIABETES MELLITUS (MULTI): Status: ACTIVE | Noted: 2024-02-12

## 2024-02-12 PROBLEM — I50.32 CHRONIC DIASTOLIC CONGESTIVE HEART FAILURE (MULTI): Status: ACTIVE | Noted: 2024-02-12

## 2024-02-12 PROBLEM — R47.01 NONVERBAL: Status: ACTIVE | Noted: 2024-02-12

## 2024-02-12 PROBLEM — I48.0 PAROXYSMAL ATRIAL FIBRILLATION (MULTI): Status: ACTIVE | Noted: 2024-02-12

## 2024-02-12 RX ORDER — ARIPIPRAZOLE 30 MG/1
0.5 TABLET ORAL 2 TIMES DAILY
COMMUNITY
Start: 2017-01-05

## 2024-02-12 RX ORDER — ATORVASTATIN CALCIUM 80 MG/1
1 TABLET, FILM COATED ORAL DAILY
COMMUNITY

## 2024-02-12 RX ORDER — ERGOCALCIFEROL 1.25 MG/1
1 CAPSULE ORAL
COMMUNITY
Start: 2019-06-06

## 2024-02-12 RX ORDER — METOPROLOL TARTRATE 50 MG/1
1 TABLET ORAL 2 TIMES DAILY
COMMUNITY
Start: 2016-11-10

## 2024-02-12 RX ORDER — IPRATROPIUM BROMIDE AND ALBUTEROL SULFATE 2.5; .5 MG/3ML; MG/3ML
3 SOLUTION RESPIRATORY (INHALATION) 4 TIMES DAILY
COMMUNITY
Start: 2017-04-04

## 2024-02-12 RX ORDER — DIVALPROEX SODIUM 250 MG/1
250 TABLET, DELAYED RELEASE ORAL 2 TIMES DAILY
COMMUNITY
Start: 2016-11-30

## 2024-02-12 RX ORDER — METFORMIN HYDROCHLORIDE 1000 MG/1
1 TABLET ORAL
COMMUNITY
Start: 2016-07-29

## 2024-02-12 RX ORDER — SERTRALINE HYDROCHLORIDE 50 MG/1
1 TABLET, FILM COATED ORAL DAILY
COMMUNITY
Start: 2020-06-04

## 2024-02-12 RX ORDER — LEVOTHYROXINE SODIUM 100 UG/1
1 TABLET ORAL DAILY
COMMUNITY
Start: 2020-06-04

## 2024-02-12 RX ORDER — LANOLIN ALCOHOL/MO/W.PET/CERES
1 CREAM (GRAM) TOPICAL DAILY
COMMUNITY
Start: 2017-03-21

## 2024-02-12 RX ORDER — APIXABAN 5 MG/1
5 TABLET, FILM COATED ORAL 2 TIMES DAILY
COMMUNITY

## 2024-02-12 RX ORDER — FUROSEMIDE 40 MG/1
40 TABLET ORAL 2 TIMES DAILY
COMMUNITY
Start: 2019-05-09

## 2024-02-12 RX ORDER — TOPIRAMATE 200 MG/1
200 TABLET ORAL 2 TIMES DAILY
COMMUNITY
Start: 2023-03-09

## 2024-02-12 RX ORDER — FLUTICASONE PROPIONATE 50 MCG
1 SPRAY, SUSPENSION (ML) NASAL 2 TIMES DAILY PRN
COMMUNITY

## 2024-02-12 RX ORDER — POLYVINYL ALCOHOL 14 MG/ML
1 SOLUTION/ DROPS OPHTHALMIC AS NEEDED
COMMUNITY
Start: 2017-04-04

## 2024-02-12 RX ORDER — ACETAMINOPHEN 325 MG/1
325 TABLET ORAL EVERY 4 HOURS PRN
COMMUNITY
Start: 2017-04-04

## 2024-02-14 ENCOUNTER — OFFICE VISIT (OUTPATIENT)
Dept: CARDIOLOGY | Facility: CLINIC | Age: 54
End: 2024-02-14
Payer: MEDICARE

## 2024-02-14 VITALS
BODY MASS INDEX: 31.31 KG/M2 | HEART RATE: 78 BPM | SYSTOLIC BLOOD PRESSURE: 138 MMHG | HEIGHT: 66 IN | DIASTOLIC BLOOD PRESSURE: 88 MMHG

## 2024-02-14 DIAGNOSIS — I50.32 CHRONIC DIASTOLIC CONGESTIVE HEART FAILURE (MULTI): ICD-10-CM

## 2024-02-14 DIAGNOSIS — I48.0 PAROXYSMAL ATRIAL FIBRILLATION (MULTI): Primary | ICD-10-CM

## 2024-02-14 DIAGNOSIS — I10 HYPERTENSION, UNSPECIFIED TYPE: ICD-10-CM

## 2024-02-14 PROCEDURE — 93000 ELECTROCARDIOGRAM COMPLETE: CPT | Performed by: INTERNAL MEDICINE

## 2024-02-14 PROCEDURE — 3079F DIAST BP 80-89 MM HG: CPT | Performed by: INTERNAL MEDICINE

## 2024-02-14 PROCEDURE — 3048F LDL-C <100 MG/DL: CPT | Performed by: INTERNAL MEDICINE

## 2024-02-14 PROCEDURE — 3075F SYST BP GE 130 - 139MM HG: CPT | Performed by: INTERNAL MEDICINE

## 2024-02-14 PROCEDURE — 99214 OFFICE O/P EST MOD 30 MIN: CPT | Performed by: INTERNAL MEDICINE

## 2024-02-14 PROCEDURE — 3044F HG A1C LEVEL LT 7.0%: CPT | Performed by: INTERNAL MEDICINE

## 2024-02-14 PROCEDURE — 1036F TOBACCO NON-USER: CPT | Performed by: INTERNAL MEDICINE

## 2024-02-14 RX ORDER — EMPAGLIFLOZIN 10 MG/1
10 TABLET, FILM COATED ORAL
COMMUNITY
Start: 2023-07-19

## 2024-02-14 RX ORDER — LACTULOSE 10 G/15ML
10 SOLUTION ORAL; RECTAL 2 TIMES DAILY
COMMUNITY

## 2024-02-14 RX ORDER — POTASSIUM CHLORIDE 750 MG/1
40 CAPSULE, EXTENDED RELEASE ORAL 2 TIMES DAILY
COMMUNITY

## 2024-02-14 RX ORDER — HYDROXYZINE HYDROCHLORIDE 25 MG/1
25 TABLET, FILM COATED ORAL 3 TIMES DAILY
COMMUNITY

## 2024-02-14 RX ORDER — INSULIN GLARGINE 100 [IU]/ML
12 INJECTION, SOLUTION SUBCUTANEOUS EVERY MORNING
COMMUNITY

## 2024-02-14 RX ORDER — HYDROXYZINE PAMOATE 50 MG/1
50 CAPSULE ORAL NIGHTLY
COMMUNITY

## 2024-02-14 RX ORDER — CLOTRIMAZOLE 1 %
CREAM (GRAM) TOPICAL 2 TIMES DAILY
COMMUNITY
Start: 2023-06-29

## 2024-02-14 NOTE — PROGRESS NOTES
"Chief Complaint:   No chief complaint on file.     History Of Present Illness:    Georges Garrett is a 54 y.o. male with developmental delay and hearing impairment. He comes from a group home and I interviewed his caregiver. He has history of paroxysmal atrial fibrillation and chronic diastolic heart failure. He was on sotalol at one point, which was discontinued due to interaction with multiple psychiatric medications.  He is currently on an anticoagulation and metoprolol.  This is annual follow-up. His caregivers do not notice any recent decline in his health. He has obstructive sleep apnea for which he uses his CPAP irregularly/rarely. There has not been any noted episodes of bleeding or black stool. He is unable to adhere to the fluid restriction. There is no noticeable dyspnea, ankle swelling, no history of syncope        EKG shows normal sinus rhythm.        Last Recorded Vitals:  Vitals:    02/14/24 1035   BP: 138/88   Pulse: 78   Height: 1.676 m (5' 6\")       Past Medical History:  He has a past medical history of Acute diastolic (congestive) heart failure (CMS/HCC) (10/06/2016), Morbid (severe) obesity due to excess calories (CMS/HCC), Other headache syndrome, Personal history of other diseases of the respiratory system, Personal history of other diseases of the respiratory system, Personal history of other endocrine, nutritional and metabolic disease, Sleep apnea, unspecified, Unspecified fracture of left talus, initial encounter for closed fracture (04/15/2016), Unspecified hearing loss, unspecified ear (09/20/2016), and Unspecified lack of expected normal physiological development in childhood (09/20/2016).    Past Surgical History:  He has no past surgical history on file.      Social History:  He reports that he has never smoked. He has never used smokeless tobacco. He reports that he does not drink alcohol and does not use drugs.    Family History:  No family history on file.     Allergies:  Patient has " no known allergies.    Outpatient Medications:  Current Outpatient Medications   Medication Instructions    acetaminophen (TYLENOL) 325 mg, oral, Every 4 hours PRN    ARIPiprazole (Abilify) 30 mg tablet 0.5 tablets, oral, 2 times daily    atorvastatin (Lipitor) 80 mg tablet 1 tablet, oral, Daily    clotrimazole (Lotrimin) 1 % cream Topical, 2 times daily    cyanocobalamin (Vitamin B-12) 1,000 mcg tablet 1 tablet, oral, Daily    divalproex (DEPAKOTE) 250 mg, oral, 2 times daily    Eliquis 5 mg, oral, 2 times daily    ergocalciferol (Vitamin D-2) 1.25 MG (27542 UT) capsule 1 capsule, oral, Weekly    fluticasone (Flonase) 50 mcg/actuation nasal spray 1 spray, Each Nostril, 2 times daily PRN    hydrOXYzine HCL (ATARAX) 25 mg, oral, 3 times daily    hydrOXYzine pamoate (VISTARIL) 50 mg, oral, Nightly    ipratropium-albuteroL (Duo-Neb) 0.5-2.5 mg/3 mL nebulizer solution 3 mL, inhalation, 4 times daily    Jardiance 10 mg, Daily RT    lactulose 10 g, oral, 2 times daily    Lantus Solostar U-100 Insulin 12 Units, subcutaneous, Every morning    Lasix 40 mg, oral, 2 times daily    levothyroxine (Synthroid, Levoxyl) 100 mcg tablet 1 tablet, oral, Daily    metFORMIN (Glucophage) 1,000 mg tablet 1 tablet, oral, 2 times daily with meals    metoprolol tartrate (Lopressor) 50 mg tablet 1 tablet, oral, 2 times daily    polyvinyl alcohol (Liquifilm Tears) 1.4 % ophthalmic solution 1 drop, ophthalmic (eye), As needed    potassium chloride ER (Micro-K) 10 mEq ER capsule 40 mEq, oral, 2 times daily    sertraline (Zoloft) 50 mg tablet 1 tablet, oral, Daily    topiramate (TOPAMAX) 200 mg, oral, 2 times daily       Physical Exam:  Physical Exam  Vitals reviewed.   Constitutional:       Appearance: Normal appearance.   Neck:      Vascular: No carotid bruit or JVD.   Cardiovascular:      Rate and Rhythm: Normal rate and regular rhythm.      Heart sounds: Normal heart sounds, S1 normal and S2 normal. No murmur heard.  Pulmonary:      Effort:  "Pulmonary effort is normal.      Breath sounds: Normal breath sounds.   Abdominal:      General: Abdomen is flat. Bowel sounds are normal.      Palpations: Abdomen is soft.   Musculoskeletal:      Right lower leg: No edema.      Left lower leg: No edema.   Skin:     General: Skin is warm.   Neurological:      Mental Status: He is alert. Mental status is at baseline.   Psychiatric:         Mood and Affect: Mood normal.         Behavior: Behavior normal.           Last Labs:  CBC -  Lab Results   Component Value Date    WBC 7.2 02/05/2024    HGB 14.7 02/05/2024    HCT 46.6 02/05/2024    MCV 96 02/05/2024     02/05/2024       CMP -  Lab Results   Component Value Date    CALCIUM 9.0 02/05/2024    PROT 6.6 02/05/2024    PROT 6.6 02/05/2024    ALBUMIN 4.0 02/05/2024    ALBUMIN 4.0 02/05/2024    AST 17 02/05/2024    AST 17 02/05/2024    ALT 17 02/05/2024    ALT 17 02/05/2024    ALKPHOS 84 02/05/2024    ALKPHOS 84 02/05/2024    BILITOT 0.3 02/05/2024    BILITOT 0.3 02/05/2024       LIPID PANEL -   Lab Results   Component Value Date    CHOL 113 02/05/2024    TRIG 172 (H) 02/05/2024    HDL 30.5 02/05/2024    CHHDL 3.7 02/05/2024    LDLF 41 01/31/2023    VLDL 34 02/05/2024    NHDL 83 02/05/2024       RENAL FUNCTION PANEL -   Lab Results   Component Value Date    GLUCOSE 92 02/05/2024     02/05/2024    K 4.3 02/05/2024     (H) 02/05/2024    CO2 29 02/05/2024    ANIONGAP 12 02/05/2024    BUN 12 02/05/2024    CREATININE 0.61 02/05/2024    GFRMALE >90 01/31/2023    CALCIUM 9.0 02/05/2024    ALBUMIN 4.0 02/05/2024    ALBUMIN 4.0 02/05/2024        Lab Results   Component Value Date    BNP 13 12/05/2019    HGBA1C 6.1 (H) 02/05/2024       Last Cardiology Tests:  ECG:  No results found for this or any previous visit from the past 1095 days.      Echo:  No results found for this or any previous visit from the past 1095 days.      Ejection Fractions:  No results found for: \"EF\"    Cath:  No results found for this or " any previous visit from the past 1095 days.      Stress Test:  No results found for this or any previous visit from the past 1095 days.      Cardiac Imaging:  No results found for this or any previous visit from the past 1095 days.          Assessment/Plan     In summary Mr. Garrett is a 54-year-old gentleman with paroxysmal atrial fibrillation and chronic diastolic heart failure.     1-paroxysmal atrial fibrillation-he is in sinus rhythm today. He is on metoprolol for heart rate control. He is chronically anticoagulated and with stable hemoglobin and hematocrit. He could not tolerate sotalol due to interaction with his necessary psychiatric medications. We advised him to check a BMP and CBC on a regular basis, 6 monthly.     2-chronic diastolic heart failure-reasonably well compensated. He is to monitor his weight regularly at the group home and notify us if he gains more than 5 pounds in a week. Continue lasix.  His blood pressure is elevated today.  Continue to monitor may have to add additional lesions if this remains over 130/80.        3- triglyceridemia- Dietary changes. If remains elevated may need treatment. Following-up with PCP for this.     Nish Gonzales MD

## 2024-04-15 ENCOUNTER — LAB REQUISITION (OUTPATIENT)
Dept: LAB | Facility: HOSPITAL | Age: 54
End: 2024-04-15
Payer: MEDICARE

## 2024-04-15 DIAGNOSIS — R94.6 ABNORMAL RESULTS OF THYROID FUNCTION STUDIES: ICD-10-CM

## 2024-04-15 DIAGNOSIS — E72.20 DISORDER OF UREA CYCLE METABOLISM, UNSPECIFIED (MULTI): ICD-10-CM

## 2024-04-15 DIAGNOSIS — R73.09 OTHER ABNORMAL GLUCOSE: ICD-10-CM

## 2024-04-15 DIAGNOSIS — K76.9 LIVER DISEASE, UNSPECIFIED: ICD-10-CM

## 2024-04-15 DIAGNOSIS — Z51.81 ENCOUNTER FOR THERAPEUTIC DRUG LEVEL MONITORING: ICD-10-CM

## 2024-04-15 LAB
ALBUMIN SERPL BCP-MCNC: 3.7 G/DL (ref 3.4–5)
ALP SERPL-CCNC: 72 U/L (ref 33–120)
ALT SERPL W P-5'-P-CCNC: 15 U/L (ref 10–52)
AMMONIA PLAS-SCNC: 40 UMOL/L (ref 16–53)
AST SERPL W P-5'-P-CCNC: 13 U/L (ref 9–39)
BILIRUB DIRECT SERPL-MCNC: 0.1 MG/DL (ref 0–0.3)
BILIRUB SERPL-MCNC: 0.3 MG/DL (ref 0–1.2)
EST. AVERAGE GLUCOSE BLD GHB EST-MCNC: 146 MG/DL
HBA1C MFR BLD: 6.7 %
PROT SERPL-MCNC: 6.2 G/DL (ref 6.4–8.2)
TSH SERPL-ACNC: 1.83 MIU/L (ref 0.44–3.98)
VALPROATE SERPL-MCNC: 61 UG/ML (ref 50–100)

## 2024-04-15 PROCEDURE — 80164 ASSAY DIPROPYLACETIC ACD TOT: CPT | Mod: OUT | Performed by: INTERNAL MEDICINE

## 2024-04-15 PROCEDURE — 83036 HEMOGLOBIN GLYCOSYLATED A1C: CPT | Mod: OUT | Performed by: INTERNAL MEDICINE

## 2024-04-15 PROCEDURE — 82140 ASSAY OF AMMONIA: CPT | Mod: OUT | Performed by: INTERNAL MEDICINE

## 2024-04-15 PROCEDURE — 84443 ASSAY THYROID STIM HORMONE: CPT | Mod: OUT | Performed by: INTERNAL MEDICINE

## 2024-04-15 PROCEDURE — 80076 HEPATIC FUNCTION PANEL: CPT | Mod: OUT | Performed by: INTERNAL MEDICINE

## 2024-08-12 PROBLEM — R09.02 HYPOXIA: Status: ACTIVE | Noted: 2020-01-10

## 2024-08-12 PROBLEM — E07.9 THYROID EYE DISEASE: Chronic | Status: ACTIVE | Noted: 2023-05-15

## 2024-08-12 PROBLEM — H25.12: Chronic | Status: ACTIVE | Noted: 2023-05-15

## 2024-08-12 PROBLEM — H57.89 THYROID EYE DISEASE: Chronic | Status: ACTIVE | Noted: 2023-05-15

## 2024-08-12 PROBLEM — K05.6 PERIODONTAL DISEASE: Status: ACTIVE | Noted: 2023-11-03

## 2024-08-12 PROBLEM — F91.9 DISTURBANCE OF CONDUCT: Status: ACTIVE | Noted: 2024-08-12

## 2024-08-12 PROBLEM — B25.9: Status: ACTIVE | Noted: 2024-08-12

## 2024-08-12 PROBLEM — I48.91 ATRIAL FIBRILLATION (MULTI): Status: ACTIVE | Noted: 2024-08-12

## 2024-08-12 PROBLEM — H25.21: Chronic | Status: ACTIVE | Noted: 2023-05-15

## 2024-08-13 ENCOUNTER — LAB REQUISITION (OUTPATIENT)
Dept: LAB | Facility: HOSPITAL | Age: 54
End: 2024-08-13
Payer: MEDICARE

## 2024-08-13 DIAGNOSIS — K76.9 LIVER DISEASE, UNSPECIFIED: ICD-10-CM

## 2024-08-13 DIAGNOSIS — Z51.81 ENCOUNTER FOR THERAPEUTIC DRUG LEVEL MONITORING: ICD-10-CM

## 2024-08-13 DIAGNOSIS — R73.09 OTHER ABNORMAL GLUCOSE: ICD-10-CM

## 2024-08-13 DIAGNOSIS — E72.20 DISORDER OF UREA CYCLE METABOLISM, UNSPECIFIED (MULTI): ICD-10-CM

## 2024-08-13 LAB
ALBUMIN SERPL BCP-MCNC: 3.5 G/DL (ref 3.4–5)
ALP SERPL-CCNC: 67 U/L (ref 33–120)
ALT SERPL W P-5'-P-CCNC: 14 U/L (ref 10–52)
AMMONIA PLAS-SCNC: 51 UMOL/L (ref 16–53)
AST SERPL W P-5'-P-CCNC: 11 U/L (ref 9–39)
BILIRUB DIRECT SERPL-MCNC: 0.1 MG/DL (ref 0–0.3)
BILIRUB SERPL-MCNC: 0.2 MG/DL (ref 0–1.2)
EST. AVERAGE GLUCOSE BLD GHB EST-MCNC: 128 MG/DL
HBA1C MFR BLD: 6.1 %
PROT SERPL-MCNC: 5.6 G/DL (ref 6.4–8.2)
VALPROATE SERPL-MCNC: 66 UG/ML (ref 50–100)

## 2024-08-13 PROCEDURE — 36415 COLL VENOUS BLD VENIPUNCTURE: CPT | Performed by: INTERNAL MEDICINE

## 2024-08-13 PROCEDURE — 83036 HEMOGLOBIN GLYCOSYLATED A1C: CPT | Mod: OUT | Performed by: INTERNAL MEDICINE

## 2024-08-13 PROCEDURE — 82140 ASSAY OF AMMONIA: CPT | Mod: OUT | Performed by: INTERNAL MEDICINE

## 2024-08-13 PROCEDURE — 80164 ASSAY DIPROPYLACETIC ACD TOT: CPT | Mod: OUT | Performed by: INTERNAL MEDICINE

## 2024-08-13 PROCEDURE — 80076 HEPATIC FUNCTION PANEL: CPT | Mod: OUT | Performed by: INTERNAL MEDICINE

## 2024-08-20 ENCOUNTER — APPOINTMENT (OUTPATIENT)
Dept: CARDIOLOGY | Facility: CLINIC | Age: 54
End: 2024-08-20
Payer: MEDICARE

## 2024-08-21 DIAGNOSIS — E11.8 TYPE 2 DIABETES MELLITUS WITH UNSPECIFIED COMPLICATIONS (MULTI): ICD-10-CM

## 2024-08-21 DIAGNOSIS — I48.0 PAROXYSMAL ATRIAL FIBRILLATION (MULTI): Primary | ICD-10-CM

## 2024-08-21 DIAGNOSIS — I50.32 CHRONIC DIASTOLIC CONGESTIVE HEART FAILURE (MULTI): ICD-10-CM

## 2024-08-21 RX ORDER — METOPROLOL TARTRATE 25 MG/1
25 TABLET, FILM COATED ORAL 2 TIMES DAILY
COMMUNITY
Start: 2024-08-14

## 2024-08-21 NOTE — TELEPHONE ENCOUNTER
Left VM to reschedule missed 08/20 [facility] appointment,  also asked what pharmacy patient uses.

## 2024-08-27 ENCOUNTER — APPOINTMENT (OUTPATIENT)
Dept: CARDIOLOGY | Facility: HOSPITAL | Age: 54
End: 2024-08-27
Payer: MEDICARE

## 2024-08-27 VITALS
HEIGHT: 66 IN | OXYGEN SATURATION: 100 % | WEIGHT: 180 LBS | SYSTOLIC BLOOD PRESSURE: 110 MMHG | BODY MASS INDEX: 28.93 KG/M2 | DIASTOLIC BLOOD PRESSURE: 60 MMHG | HEART RATE: 88 BPM

## 2024-08-27 DIAGNOSIS — I50.32 CHRONIC DIASTOLIC CONGESTIVE HEART FAILURE (MULTI): ICD-10-CM

## 2024-08-27 DIAGNOSIS — I10 PRIMARY HYPERTENSION: ICD-10-CM

## 2024-08-27 DIAGNOSIS — I48.91 ATRIAL FIBRILLATION, UNSPECIFIED TYPE (MULTI): Primary | ICD-10-CM

## 2024-08-27 PROCEDURE — 99213 OFFICE O/P EST LOW 20 MIN: CPT | Performed by: PHYSICIAN ASSISTANT

## 2024-08-27 PROCEDURE — 3078F DIAST BP <80 MM HG: CPT | Performed by: PHYSICIAN ASSISTANT

## 2024-08-27 PROCEDURE — 3044F HG A1C LEVEL LT 7.0%: CPT | Performed by: PHYSICIAN ASSISTANT

## 2024-08-27 PROCEDURE — 3008F BODY MASS INDEX DOCD: CPT | Performed by: PHYSICIAN ASSISTANT

## 2024-08-27 PROCEDURE — 1036F TOBACCO NON-USER: CPT | Performed by: PHYSICIAN ASSISTANT

## 2024-08-27 PROCEDURE — 3048F LDL-C <100 MG/DL: CPT | Performed by: PHYSICIAN ASSISTANT

## 2024-08-27 PROCEDURE — 3074F SYST BP LT 130 MM HG: CPT | Performed by: PHYSICIAN ASSISTANT

## 2024-08-27 NOTE — PATIENT INSTRUCTIONS
Please continue your current medications.  If you have any change in your cardiorespiratory status please call the office right away.  Keep appointment with Dr. Gonzales in February

## 2024-08-27 NOTE — PROGRESS NOTES
"Cardiology Follow Up  Chief Complaint:   Patient is here for 6 month office visit.      History Of Present Illness:    Georges Garrett is a 54 y.o. male with developmental delay and hearing impairment. He comes from a group home and I interviewed his caregiver. He has history of paroxysmal atrial fibrillation and chronic diastolic heart failure. He was on sotalol at one point, which was discontinued due to interaction with multiple psychiatric medications.  He is currently on an anticoagulation and metoprolol.  This is annual follow-up. His caregivers do not notice any recent decline in his health. He has obstructive sleep apnea for which he uses his CPAP irregularly/rarely. There has not been any noted episodes of bleeding or black stool. He is unable to adhere to the fluid restriction. There is no noticeable dyspnea, ankle swelling, no history of syncope        EKG shows normal sinus rhythm.  8-27-24:  Info from care manager at [facility].  Patient is deaf and blind.  Per care manager recent mechanical fall but no major injuries.  Per manager nothing to indicate CP, SOB and vitals checked regularly.  Eating and drinking OK.  No cardiac issues.       Last Recorded Vitals:  Vitals:    08/27/24 1329   BP: 110/60   BP Location: Right arm   Patient Position: Sitting   BP Cuff Size: Adult   Pulse: 88   SpO2: 100%   Weight: 81.6 kg (180 lb)   Height: 1.676 m (5' 6\")       Past Medical History:  He has a past medical history of Acute diastolic (congestive) heart failure (Multi) (10/06/2016), Morbid (severe) obesity due to excess calories (Multi), Other headache syndrome, Personal history of other diseases of the respiratory system, Personal history of other diseases of the respiratory system, Personal history of other endocrine, nutritional and metabolic disease, Sleep apnea, unspecified, Unspecified fracture of left talus, initial encounter for closed fracture (04/15/2016), Unspecified hearing loss, " unspecified ear (09/20/2016), and Unspecified lack of expected normal physiological development in childhood (09/20/2016).    Past Surgical History:  He has no past surgical history on file.      Social History:  He reports that he has never smoked. He has never used smokeless tobacco. He reports that he does not drink alcohol and does not use drugs.    Family History:  No family history on file.     Allergies:  Patient has no known allergies.    Outpatient Medications:  Current Outpatient Medications   Medication Instructions    acetaminophen (TYLENOL) 325 mg, oral, Every 4 hours PRN    ARIPiprazole (Abilify) 30 mg tablet 0.5 tablets, oral, 2 times daily    atorvastatin (Lipitor) 80 mg tablet 1 tablet, oral, Daily    clotrimazole (Lotrimin) 1 % cream Topical, 2 times daily    cyanocobalamin (Vitamin B-12) 1,000 mcg tablet 1 tablet, oral, Daily    divalproex (DEPAKOTE) 250 mg, oral, 2 times daily    Eliquis 5 mg, oral, 2 times daily    ergocalciferol (Vitamin D-2) 1.25 MG (31575 UT) capsule 1 capsule, oral, Once Weekly    fluticasone (Flonase) 50 mcg/actuation nasal spray 1 spray, Each Nostril, 2 times daily PRN    hydrOXYzine HCL (ATARAX) 25 mg, oral, 3 times daily    hydrOXYzine pamoate (VISTARIL) 50 mg, oral, Nightly    ipratropium-albuteroL (Duo-Neb) 0.5-2.5 mg/3 mL nebulizer solution 3 mL, inhalation, 4 times daily    Jardiance 10 mg, Daily RT    lactulose 10 g, oral, 2 times daily    Lantus Solostar U-100 Insulin 12 Units, subcutaneous, Every morning    Lasix 40 mg, oral, 2 times daily    levothyroxine (Synthroid, Levoxyl) 100 mcg tablet 1 tablet, oral, Daily    metFORMIN (Glucophage) 1,000 mg tablet 1 tablet, oral, 2 times daily (morning and late afternoon)    metoprolol tartrate (Lopressor) 50 mg tablet 1 tablet, oral, 2 times daily, 25 mg bid    metoprolol tartrate (LOPRESSOR) 25 mg, oral, 2 times daily    polyvinyl alcohol (Liquifilm Tears) 1.4 % ophthalmic solution 1 drop, ophthalmic (eye), As needed     potassium chloride ER (Micro-K) 10 mEq ER capsule 40 mEq, oral, 2 times daily    sertraline (Zoloft) 50 mg tablet 1 tablet, oral, Daily    topiramate (TOPAMAX) 200 mg, oral, 2 times daily     ROS   Physical Exam  Constitutional:       General: He is not in acute distress.  HENT:      Ears:      Comments: Is deaf     Mouth/Throat:      Mouth: Mucous membranes are moist.   Eyes:      Comments: Is blind   Cardiovascular:      Rate and Rhythm: Normal rate and regular rhythm.      Heart sounds: Normal heart sounds. No murmur heard.  Pulmonary:      Effort: Pulmonary effort is normal.      Breath sounds: Normal breath sounds.   Abdominal:      General: Abdomen is flat. Bowel sounds are normal.      Palpations: Abdomen is soft.   Musculoskeletal:         General: No swelling.   Skin:     General: Skin is warm and dry.   Neurological:      Mental Status: He is alert and oriented to person, place, and time.   Psychiatric:         Mood and Affect: Mood normal.           Last Labs:  CBC -  Lab Results   Component Value Date    WBC 7.2 02/05/2024    HGB 14.7 02/05/2024    HCT 46.6 02/05/2024    MCV 96 02/05/2024     02/05/2024       CMP -  Lab Results   Component Value Date    CALCIUM 9.0 02/05/2024    PROT 5.6 (L) 08/13/2024    ALBUMIN 3.5 08/13/2024    AST 11 08/13/2024    ALT 14 08/13/2024    ALKPHOS 67 08/13/2024    BILITOT 0.2 08/13/2024       LIPID PANEL -   Lab Results   Component Value Date    CHOL 113 02/05/2024    TRIG 172 (H) 02/05/2024    HDL 30.5 02/05/2024    CHHDL 3.7 02/05/2024    LDLF 41 01/31/2023    VLDL 34 02/05/2024    NHDL 83 02/05/2024       RENAL FUNCTION PANEL -   Lab Results   Component Value Date    GLUCOSE 92 02/05/2024     02/05/2024    K 4.3 02/05/2024     (H) 02/05/2024    CO2 29 02/05/2024    ANIONGAP 12 02/05/2024    BUN 12 02/05/2024    CREATININE 0.61 02/05/2024    GFRMALE >90 01/31/2023    CALCIUM 9.0 02/05/2024    ALBUMIN 3.5 08/13/2024        Lab Results   Component Value  Date    BNP 13 12/05/2019    HGBA1C 6.1 (H) 08/13/2024       Last Cardiology Tests:      Lab review: I have personally reviewed the laboratory result(s).    Assessment/Plan   Problem List Items Addressed This Visit             ICD-10-CM       Cardiac and Vasculature    Hypertension I10    Chronic diastolic congestive heart failure (Multi) I50.32    Relevant Medications    metoprolol tartrate (Lopressor) 25 mg tablet    Atrial fibrillation (Multi) - Primary I48.91    Relevant Medications    metoprolol tartrate (Lopressor) 25 mg tablet     History of hypertension--in the last 6 months blood pressures have been on the lower side so his metoprolol dosing was decreased and blood pressures are much improved.  No symptoms to indicate that the patient is having hypotension as he has not had syncope but he did have a recent mechanical fall which again was due to him tripping over something not seen due to his blindness.  Chronic diastolic heart failure--appears very well compensated and he is on the metoprolol Lasix Jardiance and they do restrict his salt and fluid at [facility].  Atrial fibrillation--last EKG showed sinus rhythm and by physical examination is nice and regular.  Continue metoprolol and oral anticoagulation therapy.  There have been no obvious bleeding complications.  Overall care manager states that he seems to be doing fairly well with no acute cardiac issues.  Patient is already scheduled back with Dr. Gonzales in February.  Certainly if there is any change that they observe in how he is acting or if he is having overt symptoms that they observe they are instructed to contact the office.      Edy Huerta PA-C  8/27/2024  1:36 PM

## 2024-08-28 ENCOUNTER — CLINICAL SUPPORT (OUTPATIENT)
Dept: AUDIOLOGY | Facility: HOSPITAL | Age: 54
End: 2024-08-28
Payer: MEDICARE

## 2024-08-28 DIAGNOSIS — R94.128 ABNORMAL TYMPANOGRAM: ICD-10-CM

## 2024-08-28 DIAGNOSIS — H90.3 SENSORINEURAL HEARING LOSS (SNHL) OF BOTH EARS: Primary | ICD-10-CM

## 2024-08-28 PROCEDURE — 92550 TYMPANOMETRY & REFLEX THRESH: CPT | Performed by: AUDIOLOGIST

## 2024-08-28 PROCEDURE — 92555 SPEECH THRESHOLD AUDIOMETRY: CPT | Performed by: AUDIOLOGIST

## 2024-08-28 NOTE — LETTER
AUDIOLOGY ADULT AUDIOMETRIC EVALUATION      Name:  Georges Garrett  :  1970  Age:  54 y.o.  Date of Evaluation:  2024     IMPRESSIONS:  Today's test results are consistent with severe to profound behavioral hearing response to speech stimuli bilaterally, consistent with previous test results,  His tympanograms are abnormal, showing negative middle ear pressure in the right ear and reduced tympanic membrane mobility in the left ear.    RECOMMENDATIONS:  -Medical / Otolaryngology consultation regarding abnormal tympanograms  -If family chooses to proceed with consultation for hearing aids or other amplification, subjective testing (such as Auditory Brainstem Response) would be recommended.  Otherwise, Audiologic monitoring is recommended every three years, or as changes are noted.     ------------------------------------------------    HISTORY:  Reason for visit:  Mr. Garrett is seen today at the request of LIDIA Ruiz MD, for an evaluation of hearing, routine per his residential care facility.  He was accompanied by his caregiver, Christelle, from his residential facility.  She feels like he can hear loud sounds, but she is unsure of actual hearing status.    There are no known otitis media, ear drainage, ear pain, or other audiologic / otologic symptoms.        Previous hearing test:   most recently in  , historically his tests show poor or no behavioral responses to speech or tonal stimuli.  Consideration of Auditory Brainstem Response testing and possibly hearing aid fitting have been recommended.      EVALUATION    Otoscopic Evaluation:        Mild cerumen in the ear canal, tympanic membrane visualized bilaterally                     Immittance Measures: 226Hz       Right ear:  Abnormal tympanogram, showing negative middle ear pressure with normal ear canal volume and static compliance. Acoustic reflexes were absent.         Left ear:  Tympanogram is abnormal, showing very low static compliance (flat  configuration) with normal ear canal volume. Acoustic reflexes were absent.      Pure Tone Audiometry:    Testing was attempted unsuccessfully Visual Reinforcement Audiometry and Behavioral Observation Audiometry.  Patient was not responsive to tonal stimuli.    Speech Audiometry:        Right ear:  Speech Awareness Threshold (SAT) was observed at 85 dBHL with poor response reliability       Left ear:  Speech Awareness Threshold (SAT) was observed at 80 dBHL with poor response reliability                 Word Recognition was not attempted as the patient was generally not responsive and is nonverbal              Distortion Product Otoacoustic Emissions: Assesses the cochlear outer hair cell function (7493-8892 Hz frequency range)        Both ears:  absent 1500-8000Hz             PATIENT EDUCATION:   Discussed results and recommendations with Georges's caregiver, Christelle.  Questions were addressed and they were encouraged to contact our department should concerns arise.    Time:  08:35 to 08:50    SARTHAK Bush, CCC-A  Senior Audiologist

## 2024-08-28 NOTE — PROGRESS NOTES
AUDIOLOGY ADULT AUDIOMETRIC EVALUATION      Name:  Georges Garrett  :  1970  Age:  54 y.o.  Date of Evaluation:  2024     IMPRESSIONS:  Today's test results are consistent with severe to profound behavioral hearing response to speech stimuli bilaterally, consistent with previous test results,  His tympanograms are abnormal, showing negative middle ear pressure in the right ear and reduced tympanic membrane mobility in the left ear.    RECOMMENDATIONS:  -Medical / Otolaryngology consultation regarding abnormal tympanograms  -If family chooses to proceed with consultation for hearing aids or other amplification, subjective testing (such as Auditory Brainstem Response) would be recommended.  Otherwise, Audiologic monitoring is recommended every three years, or as changes are noted.     ------------------------------------------------    HISTORY:  Reason for visit:  Mr. Garrett is seen today at the request of LIDIA Ruiz MD, for an evaluation of hearing, routine per his residential care facility.  He was accompanied by his caregiver, Christelle, from his residential facility.  She feels like he can hear loud sounds, but she is unsure of actual hearing status.    There are no known otitis media, ear drainage, ear pain, or other audiologic / otologic symptoms.        Previous hearing test:   most recently in  , historically his tests show poor or no behavioral responses to speech or tonal stimuli.  Consideration of Auditory Brainstem Response testing and possibly hearing aid fitting have been recommended.      EVALUATION    Otoscopic Evaluation:        Mild cerumen in the ear canal, tympanic membrane visualized bilaterally                     Immittance Measures: 226Hz       Right ear:  Abnormal tympanogram, showing negative middle ear pressure with normal ear canal volume and static compliance. Acoustic reflexes were absent.         Left ear:  Tympanogram is abnormal, showing very low static compliance (flat  configuration) with normal ear canal volume. Acoustic reflexes were absent.      Pure Tone Audiometry:    Testing was attempted unsuccessfully Visual Reinforcement Audiometry and Behavioral Observation Audiometry.  Patient was not responsive to tonal stimuli.    Speech Audiometry:        Right ear:  Speech Awareness Threshold (SAT) was observed at 85 dBHL with poor response reliability       Left ear:  Speech Awareness Threshold (SAT) was observed at 80 dBHL with poor response reliability                 Word Recognition was not attempted as the patient was generally not responsive and is nonverbal              Distortion Product Otoacoustic Emissions: Assesses the cochlear outer hair cell function (5700-7590 Hz frequency range)        Both ears:  absent 1500-8000Hz             PATIENT EDUCATION:   Discussed results and recommendations with Georges's caregiver, Christelle.  Questions were addressed and they were encouraged to contact our department should concerns arise.    Time:  08:35 to 08:50    SARTHAK Bush, CCC-A  Senior Audiologist

## 2024-09-04 RX ORDER — ATORVASTATIN CALCIUM 80 MG/1
80 TABLET, FILM COATED ORAL DAILY
Qty: 90 TABLET | Refills: 1 | Status: SHIPPED | OUTPATIENT
Start: 2024-09-04 | End: 2025-03-03

## 2024-09-04 RX ORDER — FUROSEMIDE 40 MG/1
40 TABLET ORAL 2 TIMES DAILY
Qty: 180 TABLET | Refills: 1 | Status: SHIPPED | OUTPATIENT
Start: 2024-09-04 | End: 2025-03-03

## 2024-09-04 NOTE — TELEPHONE ENCOUNTER
----- Message from Jie MURILLO sent at 9/3/2024 10:28 AM EDT -----  Regarding: [facility] Ref[facility] Needed  Patient advocate from SNF called for refill of Jardiance 10 mg once daily,   Lasix 40 mg tablet once daily, Eliquis 5 mg tablet twice daily, and   atorvastatin (Lipitor) 80 mg tablet.  Please send to [facility].

## 2024-10-14 ENCOUNTER — LAB REQUISITION (OUTPATIENT)
Dept: LAB | Facility: HOSPITAL | Age: 54
End: 2024-10-14
Payer: MEDICARE

## 2024-10-14 DIAGNOSIS — K76.9 LIVER DISEASE, UNSPECIFIED: ICD-10-CM

## 2024-10-14 LAB
ALBUMIN SERPL BCP-MCNC: 3.7 G/DL (ref 3.4–5)
ALP SERPL-CCNC: 80 U/L (ref 33–120)
ALT SERPL W P-5'-P-CCNC: 15 U/L (ref 10–52)
AST SERPL W P-5'-P-CCNC: 14 U/L (ref 9–39)
BILIRUB DIRECT SERPL-MCNC: 0.1 MG/DL (ref 0–0.3)
BILIRUB SERPL-MCNC: 0.3 MG/DL (ref 0–1.2)
PROT SERPL-MCNC: 5.8 G/DL (ref 6.4–8.2)

## 2024-10-14 PROCEDURE — 36415 COLL VENOUS BLD VENIPUNCTURE: CPT | Mod: OUT | Performed by: INTERNAL MEDICINE

## 2024-10-14 PROCEDURE — 80076 HEPATIC FUNCTION PANEL: CPT | Mod: OUT | Performed by: INTERNAL MEDICINE

## 2024-11-21 ENCOUNTER — APPOINTMENT (OUTPATIENT)
Dept: OPHTHALMOLOGY | Age: 54
End: 2024-11-21
Payer: MEDICARE

## 2024-11-21 DIAGNOSIS — H04.129 DRYNESS OF EYE: ICD-10-CM

## 2024-11-21 DIAGNOSIS — H21.89: Primary | ICD-10-CM

## 2024-11-21 DIAGNOSIS — H26.8 MATURE CATARACT: ICD-10-CM

## 2024-11-21 PROCEDURE — 99203 OFFICE O/P NEW LOW 30 MIN: CPT | Performed by: OPHTHALMOLOGY

## 2024-11-21 RX ORDER — PREDNISOLONE ACETATE 10 MG/ML
SUSPENSION/ DROPS OPHTHALMIC
Qty: 10 ML | Refills: 1 | Status: SHIPPED | OUTPATIENT
Start: 2024-11-21

## 2024-11-21 ASSESSMENT — TONOMETRY
IOP_METHOD: GOLDMANN APPLANATION
OD_IOP_MMHG: DEFER
OS_IOP_MMHG: DEFER

## 2024-11-21 ASSESSMENT — VISUAL ACUITY
OD_SC: UNABLE
METHOD: SNELLEN - LINEAR
OS_SC: UNABLE

## 2024-11-21 ASSESSMENT — SLIT LAMP EXAM - LIDS
COMMENTS: NORMAL
COMMENTS: NORMAL

## 2024-11-21 ASSESSMENT — ENCOUNTER SYMPTOMS: EYES NEGATIVE: 1

## 2024-11-21 NOTE — PROGRESS NOTES
Assessment/Plan   Diagnoses and all orders for this visit:  Ciliary injection  Patient has +2 ciliary injection with +1 cells, extensive rebeosis. Tried to check his intraocular pressure (IOP) many times with the tonopen (wan't able due to cooperation) but it seems to be on the lower side.  There is also VH In the left eye with no view to the retina.    Dryness of eye  +2 diffuse punctate epithelial erosions (PEE) in both eyess  Mature cataract  In the Right eye, had complicated post op recovery after cataract surgery in the left eye done in 11/2023, eye rubbing taking off the shield. Last time was seen by his surgeon was in 09/2023 in which she documented scarring in the posterior capsule obscuring view to the posterior chamber.     Plan:  Start the following drops:  -     prednisoLONE acetate (Pred-Forte) 1 % ophthalmic suspension; 4 times daily for 1 week then three times daily for 1 week then twice daily for 1 week the once daily until next visit in the left eye.  -     dextran 70-hypromellose, PF, (Bion Tears) 0.1-0.3 % ophthalmic solution; Administer 1 drop into both eyes 4 times a day.    See in 1 month.

## 2024-12-23 ENCOUNTER — APPOINTMENT (OUTPATIENT)
Dept: OPHTHALMOLOGY | Age: 54
End: 2024-12-23
Payer: MEDICARE

## 2024-12-23 DIAGNOSIS — H33.22 RETINAL DETACHMENT, LEFT: ICD-10-CM

## 2024-12-23 DIAGNOSIS — H26.8 MATURE CATARACT: Primary | ICD-10-CM

## 2024-12-23 DIAGNOSIS — H04.129 DRYNESS OF EYE: Primary | ICD-10-CM

## 2024-12-23 PROCEDURE — 3048F LDL-C <100 MG/DL: CPT | Performed by: OPHTHALMOLOGY

## 2024-12-23 PROCEDURE — 3044F HG A1C LEVEL LT 7.0%: CPT | Performed by: OPHTHALMOLOGY

## 2024-12-23 PROCEDURE — 99213 OFFICE O/P EST LOW 20 MIN: CPT | Performed by: OPHTHALMOLOGY

## 2024-12-23 RX ORDER — DEXTRAN 70 AND HYPROMELLOSE 2910 1; 3 MG/ML; MG/ML
SOLUTION/ DROPS OPHTHALMIC
Qty: 30 ML | Refills: 2 | Status: SHIPPED | OUTPATIENT
Start: 2024-12-23

## 2024-12-23 ASSESSMENT — TONOMETRY
OD_IOP_MMHG: SOFT
OS_IOP_MMHG: SOFT
IOP_METHOD: GOLDMANN APPLANATION

## 2024-12-23 ASSESSMENT — ENCOUNTER SYMPTOMS
ENDOCRINE NEGATIVE: 0
EYES NEGATIVE: 1
NEUROLOGICAL NEGATIVE: 0
HEMATOLOGIC/LYMPHATIC NEGATIVE: 0
MUSCULOSKELETAL NEGATIVE: 0
RESPIRATORY NEGATIVE: 0
PSYCHIATRIC NEGATIVE: 0
CARDIOVASCULAR NEGATIVE: 0
CONSTITUTIONAL NEGATIVE: 0
GASTROINTESTINAL NEGATIVE: 0
ALLERGIC/IMMUNOLOGIC NEGATIVE: 0

## 2024-12-23 ASSESSMENT — SLIT LAMP EXAM - LIDS
COMMENTS: NORMAL
COMMENTS: NORMAL

## 2024-12-23 ASSESSMENT — VISUAL ACUITY
METHOD: SNELLEN - LINEAR
OD_SC: FIX AND FOLLOW

## 2024-12-23 NOTE — PROGRESS NOTES
Assessment/Plan   Diagnoses and all orders for this visit:  Mature cataract  Retinal detachment, left  had complicated post op recovery after cataract surgery in the left eye done in 11/2023, eye rubbing taking off the shield. Last time was seen by his surgeon was in 09/2023 in which she documented scarring in the posterior capsule obscuring view to the posterior chamber.     Presented Last month with new onset redness in the left eye. On exam there was +1-2 cells, extensive rubeosis and VH.     On today's exam, ciliary injection is better after using Pred with weekly taper, less VH but there is retinal detachment (RD) on B-scan (seems old)    Plan:  Use Artificial tears TID in both eyes.  See retina in 1-2 weeks, Will discuss with retina what's the best option for him for visual rehabilitation.   Right eye cataract surgery Vs pars plana vitrectomy (PPV)/retinal detachment (RD) surgery in the left eye.    Patient is here with care giver, any surgery decision needs to be discussed with his Guardian (Legal appointed guardian)

## 2024-12-31 RX ORDER — THIORIDAZINE HYDROCHLORIDE 25 MG/1
25 TABLET, FILM COATED ORAL DAILY
COMMUNITY
Start: 2024-12-17

## 2024-12-31 RX ORDER — THIORIDAZINE HYDROCHLORIDE 50 MG/1
50 TABLET, FILM COATED ORAL DAILY
COMMUNITY
Start: 2024-12-17

## 2025-01-13 ENCOUNTER — LAB REQUISITION (OUTPATIENT)
Dept: LAB | Facility: HOSPITAL | Age: 55
End: 2025-01-13
Payer: MEDICARE

## 2025-01-13 DIAGNOSIS — E78.5 HYPERLIPIDEMIA, UNSPECIFIED: ICD-10-CM

## 2025-01-13 DIAGNOSIS — Z51.81 ENCOUNTER FOR THERAPEUTIC DRUG LEVEL MONITORING: ICD-10-CM

## 2025-01-13 DIAGNOSIS — K76.9 LIVER DISEASE, UNSPECIFIED: ICD-10-CM

## 2025-01-13 DIAGNOSIS — R73.09 OTHER ABNORMAL GLUCOSE: ICD-10-CM

## 2025-01-13 DIAGNOSIS — E55.9 VITAMIN D DEFICIENCY, UNSPECIFIED: ICD-10-CM

## 2025-01-13 DIAGNOSIS — E72.20 DISORDER OF UREA CYCLE METABOLISM, UNSPECIFIED (MULTI): ICD-10-CM

## 2025-01-13 DIAGNOSIS — R94.6 ABNORMAL RESULTS OF THYROID FUNCTION STUDIES: ICD-10-CM

## 2025-01-13 DIAGNOSIS — R79.89 OTHER SPECIFIED ABNORMAL FINDINGS OF BLOOD CHEMISTRY: ICD-10-CM

## 2025-01-13 DIAGNOSIS — R68.89 OTHER GENERAL SYMPTOMS AND SIGNS: ICD-10-CM

## 2025-01-13 LAB
25(OH)D3 SERPL-MCNC: 60 NG/ML (ref 30–100)
ALBUMIN SERPL BCP-MCNC: 3.9 G/DL (ref 3.4–5)
ALP SERPL-CCNC: 77 U/L (ref 33–120)
ALT SERPL W P-5'-P-CCNC: 13 U/L (ref 10–52)
AMMONIA PLAS-SCNC: 42 UMOL/L (ref 16–53)
ANION GAP SERPL CALC-SCNC: 15 MMOL/L (ref 10–20)
AST SERPL W P-5'-P-CCNC: 12 U/L (ref 9–39)
BASOPHILS # BLD AUTO: 0.01 X10*3/UL (ref 0–0.1)
BASOPHILS NFR BLD AUTO: 0.2 %
BILIRUB DIRECT SERPL-MCNC: 0.1 MG/DL (ref 0–0.3)
BILIRUB SERPL-MCNC: 0.2 MG/DL (ref 0–1.2)
BUN SERPL-MCNC: 17 MG/DL (ref 6–23)
CALCIUM SERPL-MCNC: 9.2 MG/DL (ref 8.6–10.3)
CHLORIDE SERPL-SCNC: 107 MMOL/L (ref 98–107)
CHOLEST SERPL-MCNC: 104 MG/DL (ref 0–199)
CHOLESTEROL/HDL RATIO: 3.3
CO2 SERPL-SCNC: 28 MMOL/L (ref 21–32)
CREAT SERPL-MCNC: 0.63 MG/DL (ref 0.5–1.3)
EGFRCR SERPLBLD CKD-EPI 2021: >90 ML/MIN/1.73M*2
EOSINOPHIL # BLD AUTO: 0.02 X10*3/UL (ref 0–0.7)
EOSINOPHIL NFR BLD AUTO: 0.3 %
ERYTHROCYTE [DISTWIDTH] IN BLOOD BY AUTOMATED COUNT: 15.5 % (ref 11.5–14.5)
GLUCOSE SERPL-MCNC: 128 MG/DL (ref 74–99)
HCT VFR BLD AUTO: 42.9 % (ref 41–52)
HDLC SERPL-MCNC: 32 MG/DL
HGB BLD-MCNC: 13.9 G/DL (ref 13.5–17.5)
IMM GRANULOCYTES # BLD AUTO: 0.04 X10*3/UL (ref 0–0.7)
IMM GRANULOCYTES NFR BLD AUTO: 0.6 % (ref 0–0.9)
LDLC SERPL CALC-MCNC: 39 MG/DL
LDLC SERPL DIRECT ASSAY-MCNC: 49 MG/DL (ref 0–129)
LYMPHOCYTES # BLD AUTO: 2.07 X10*3/UL (ref 1.2–4.8)
LYMPHOCYTES NFR BLD AUTO: 32.6 %
MCH RBC QN AUTO: 30.7 PG (ref 26–34)
MCHC RBC AUTO-ENTMCNC: 32.4 G/DL (ref 32–36)
MCV RBC AUTO: 95 FL (ref 80–100)
MONOCYTES # BLD AUTO: 0.58 X10*3/UL (ref 0.1–1)
MONOCYTES NFR BLD AUTO: 9.1 %
NEUTROPHILS # BLD AUTO: 3.62 X10*3/UL (ref 1.2–7.7)
NEUTROPHILS NFR BLD AUTO: 57.2 %
NON HDL CHOLESTEROL: 72 MG/DL (ref 0–149)
NRBC BLD-RTO: 0 /100 WBCS (ref 0–0)
PLATELET # BLD AUTO: 173 X10*3/UL (ref 150–450)
POTASSIUM SERPL-SCNC: 3.9 MMOL/L (ref 3.5–5.3)
PROT SERPL-MCNC: 5.8 G/DL (ref 6.4–8.2)
RBC # BLD AUTO: 4.53 X10*6/UL (ref 4.5–5.9)
SODIUM SERPL-SCNC: 146 MMOL/L (ref 136–145)
TRIGL SERPL-MCNC: 164 MG/DL (ref 0–149)
TSH SERPL-ACNC: 2.15 MIU/L (ref 0.44–3.98)
VALPROATE SERPL-MCNC: 29 UG/ML (ref 50–100)
VLDL: 33 MG/DL (ref 0–40)
WBC # BLD AUTO: 6.3 X10*3/UL (ref 4.4–11.3)

## 2025-01-13 PROCEDURE — 85025 COMPLETE CBC W/AUTO DIFF WBC: CPT | Mod: OUT

## 2025-01-13 PROCEDURE — 82248 BILIRUBIN DIRECT: CPT | Mod: OUT

## 2025-01-13 PROCEDURE — 82140 ASSAY OF AMMONIA: CPT | Mod: OUT

## 2025-01-13 PROCEDURE — 36415 COLL VENOUS BLD VENIPUNCTURE: CPT | Mod: OUT

## 2025-01-13 PROCEDURE — 82306 VITAMIN D 25 HYDROXY: CPT | Mod: OUT

## 2025-01-13 PROCEDURE — 84443 ASSAY THYROID STIM HORMONE: CPT | Mod: OUT

## 2025-01-13 PROCEDURE — 83036 HEMOGLOBIN GLYCOSYLATED A1C: CPT | Mod: OUT,PORLAB

## 2025-01-13 PROCEDURE — 80053 COMPREHEN METABOLIC PANEL: CPT | Mod: OUT

## 2025-01-13 PROCEDURE — 80164 ASSAY DIPROPYLACETIC ACD TOT: CPT | Mod: OUT

## 2025-01-13 PROCEDURE — 80061 LIPID PANEL: CPT | Mod: OUT

## 2025-01-13 PROCEDURE — 83721 ASSAY OF BLOOD LIPOPROTEIN: CPT | Mod: 59,OUT,PORLAB

## 2025-01-14 LAB
EST. AVERAGE GLUCOSE BLD GHB EST-MCNC: 114 MG/DL
HBA1C MFR BLD: 5.6 %

## 2025-01-16 ENCOUNTER — APPOINTMENT (OUTPATIENT)
Dept: OPHTHALMOLOGY | Facility: CLINIC | Age: 55
End: 2025-01-16
Payer: MEDICARE

## 2025-02-20 ENCOUNTER — APPOINTMENT (OUTPATIENT)
Dept: CARDIOLOGY | Facility: CLINIC | Age: 55
End: 2025-02-20
Payer: MEDICARE

## 2025-02-20 VITALS
DIASTOLIC BLOOD PRESSURE: 58 MMHG | HEART RATE: 73 BPM | HEIGHT: 66 IN | SYSTOLIC BLOOD PRESSURE: 98 MMHG | BODY MASS INDEX: 27.97 KG/M2 | WEIGHT: 174 LBS

## 2025-02-20 DIAGNOSIS — I10 HYPERTENSION, UNSPECIFIED TYPE: ICD-10-CM

## 2025-02-20 DIAGNOSIS — I48.0 PAROXYSMAL ATRIAL FIBRILLATION (MULTI): ICD-10-CM

## 2025-02-20 DIAGNOSIS — H21.89: ICD-10-CM

## 2025-02-20 DIAGNOSIS — H04.129 DRYNESS OF EYE: ICD-10-CM

## 2025-02-20 DIAGNOSIS — I50.32 CHRONIC DIASTOLIC CONGESTIVE HEART FAILURE: ICD-10-CM

## 2025-02-20 PROBLEM — I48.91 ATRIAL FIBRILLATION (MULTI): Status: RESOLVED | Noted: 2024-08-12 | Resolved: 2025-02-20

## 2025-02-20 PROCEDURE — 99214 OFFICE O/P EST MOD 30 MIN: CPT | Performed by: INTERNAL MEDICINE

## 2025-02-20 PROCEDURE — 3048F LDL-C <100 MG/DL: CPT | Performed by: INTERNAL MEDICINE

## 2025-02-20 PROCEDURE — 3044F HG A1C LEVEL LT 7.0%: CPT | Performed by: INTERNAL MEDICINE

## 2025-02-20 PROCEDURE — 1036F TOBACCO NON-USER: CPT | Performed by: INTERNAL MEDICINE

## 2025-02-20 PROCEDURE — 93005 ELECTROCARDIOGRAM TRACING: CPT | Performed by: INTERNAL MEDICINE

## 2025-02-20 PROCEDURE — 3074F SYST BP LT 130 MM HG: CPT | Performed by: INTERNAL MEDICINE

## 2025-02-20 PROCEDURE — 3078F DIAST BP <80 MM HG: CPT | Performed by: INTERNAL MEDICINE

## 2025-02-20 PROCEDURE — 3008F BODY MASS INDEX DOCD: CPT | Performed by: INTERNAL MEDICINE

## 2025-02-20 RX ORDER — DEXTRAN 70, HYPROMELLOSE 2910 3; 1 MG/ML; MG/ML
SOLUTION/ DROPS OPHTHALMIC
Qty: 36 EACH | Refills: 0 | Status: SHIPPED | OUTPATIENT
Start: 2025-02-20

## 2025-02-20 RX ORDER — LORATADINE 10 MG
10 TABLET,DISINTEGRATING ORAL DAILY
COMMUNITY

## 2025-02-20 ASSESSMENT — ENCOUNTER SYMPTOMS
DEPRESSION: 0
OCCASIONAL FEELINGS OF UNSTEADINESS: 0
LOSS OF SENSATION IN FEET: 0

## 2025-02-20 NOTE — PROGRESS NOTES
"Chief Complaint:   Annual Exam (yearly)     History Of Present Illness:    Georges Garrett is a 55 y.o. male presenting for annual follow-up.  He has a past medical history of developmental delay and hearing impairment. He comes from a group home accompanied by his caregiver.  He is nonverbal at baseline.  His caregiver states he is doing quite well and there are no major issues.    He has history of paroxysmal atrial fibrillation and chronic diastolic heart failure. He was on sotalol at one point, which was discontinued due to interaction with multiple psychiatric medications.  He is currently on an anticoagulation and metoprolol.  His caregivers do not notice any recent decline in his health. He has obstructive sleep apnea for which he uses his CPAP irregularly/rarely. There has not been any noted episodes of bleeding or black stool. He is unable to adhere to the fluid restriction. There is no noticeable dyspnea, ankle swelling, no history of syncope        EKG shows normal sinus rhythm.     Last Recorded Vitals:  Vitals:    02/20/25 1320   BP: 98/58   Pulse: 73   Weight: 78.9 kg (174 lb)   Height: 1.676 m (5' 6\")       Past Medical History:  He has a past medical history of Acute diastolic (congestive) heart failure (10/06/2016), Morbid (severe) obesity due to excess calories (Multi), Other headache syndrome, Personal history of other diseases of the respiratory system, Personal history of other diseases of the respiratory system, Personal history of other endocrine, nutritional and metabolic disease, Sleep apnea, unspecified, Unspecified fracture of left talus, initial encounter for closed fracture (04/15/2016), Unspecified hearing loss, unspecified ear (09/20/2016), and Unspecified lack of expected normal physiological development in childhood (09/20/2016).    Past Surgical History:  He has no past surgical history on file.      Social History:  He reports that he has never smoked. He has never used smokeless " tobacco. He reports that he does not drink alcohol and does not use drugs.    Family History:  No family history on file.     Allergies:  Patient has no known allergies.    Outpatient Medications:  Current Outpatient Medications   Medication Instructions    acetaminophen (TYLENOL) 325 mg, Every 4 hours PRN    apixaban (ELIQUIS) 5 mg, oral, 2 times daily    ARIPiprazole (Abilify) 30 mg tablet 0.5 tablets, 2 times daily    atorvastatin (LIPITOR) 80 mg, oral, Daily    clotrimazole (Lotrimin) 1 % cream 2 times daily    cyanocobalamin (Vitamin B-12) 1,000 mcg tablet 1 tablet, Daily    divalproex (DEPAKOTE) 250 mg, 2 times daily    empagliflozin (JARDIANCE) 10 mg, oral, Daily    ergocalciferol (Vitamin D-2) 1.25 MG (46755 UT) capsule 1 capsule, Once Weekly    fluticasone (Flonase) 50 mcg/actuation nasal spray 1 spray, 2 times daily PRN    furosemide (LASIX) 40 mg, oral, 2 times daily    GenTeal Tears Mild 0.1-0.3 % ophthalmic solution INSTILL DROPS INTO BOTH EYES THREE TIMES DAILY    GenTeal Tears Moderate, PF, 0.1-0.3 % ophthalmic solution INSTILL DROPS INTO BOTH EYES THREE TIMES DAILY    hydrOXYzine HCL (ATARAX) 25 mg, 3 times daily    hydrOXYzine pamoate (VISTARIL) 50 mg, Nightly    ipratropium-albuteroL (Duo-Neb) 0.5-2.5 mg/3 mL nebulizer solution 3 mL, 4 times daily    lactulose 10 g, 2 times daily    Lantus Solostar U-100 Insulin 12 Units, Every morning    levothyroxine (Synthroid, Levoxyl) 100 mcg tablet 1 tablet, Daily    loratadine (CLARITIN REDITABS) 10 mg, Daily    metFORMIN (Glucophage) 1,000 mg tablet 1 tablet, 2 times daily (morning and late afternoon)    metoprolol tartrate (LOPRESSOR) 25 mg, 2 times daily    polyvinyl alcohol (Liquifilm Tears) 1.4 % ophthalmic solution 1 drop, As needed    potassium chloride ER (Micro-K) 10 mEq ER capsule 40 mEq, 2 times daily    prednisoLONE acetate (Pred-Forte) 1 % ophthalmic suspension 4 times daily for 1 week then three times daily for 1 week then twice daily for 1  week the once daily until next visit in the left eye.    sertraline (Zoloft) 50 mg tablet 1 tablet, Daily    thioridazine (MELLARIL) 25 mg, Daily    thioridazine (MELLARIL) 50 mg, Daily    topiramate (TOPAMAX) 200 mg, 2 times daily       Physical Exam:  Physical Exam  Vitals reviewed.   Constitutional:       Appearance: Normal appearance.   Neck:      Vascular: No carotid bruit or JVD.   Cardiovascular:      Rate and Rhythm: Normal rate and regular rhythm.      Heart sounds: Normal heart sounds, S1 normal and S2 normal. No murmur heard.  Pulmonary:      Effort: Pulmonary effort is normal.      Comments: Unable to follow commands to auscultate the lung properly  Abdominal:      General: Abdomen is flat. Bowel sounds are normal.      Palpations: Abdomen is soft.   Musculoskeletal:      Right lower leg: No edema.      Left lower leg: No edema.   Skin:     General: Skin is warm.   Neurological:      Mental Status: He is alert.   Psychiatric:         Mood and Affect: Mood normal.         Behavior: Behavior normal.           Last Labs:  CBC -  Lab Results   Component Value Date    WBC 6.3 01/13/2025    HGB 13.9 01/13/2025    HCT 42.9 01/13/2025    MCV 95 01/13/2025     01/13/2025       CMP -  Lab Results   Component Value Date    CALCIUM 9.2 01/13/2025    PROT 5.8 (L) 01/13/2025    ALBUMIN 3.9 01/13/2025    AST 12 01/13/2025    ALT 13 01/13/2025    ALKPHOS 77 01/13/2025    BILITOT 0.2 01/13/2025       LIPID PANEL -   Lab Results   Component Value Date    CHOL 104 01/13/2025    TRIG 164 (H) 01/13/2025    HDL 32.0 01/13/2025    CHHDL 3.3 01/13/2025    LDLF 41 01/31/2023    VLDL 33 01/13/2025    NHDL 72 01/13/2025       RENAL FUNCTION PANEL -   Lab Results   Component Value Date    GLUCOSE 128 (H) 01/13/2025     (H) 01/13/2025    K 3.9 01/13/2025     01/13/2025    CO2 28 01/13/2025    ANIONGAP 15 01/13/2025    BUN 17 01/13/2025    CREATININE 0.63 01/13/2025    GFRMALE >90 01/31/2023    CALCIUM 9.2  "01/13/2025    ALBUMIN 3.9 01/13/2025        Lab Results   Component Value Date    BNP 13 12/05/2019    HGBA1C 5.6 01/13/2025       Last Cardiology Tests:  ECG:  ECG 12 Lead 02/14/2024      Echo:  No results found for this or any previous visit from the past 1095 days.      Ejection Fractions:  No results found for: \"EF\"    Cath:  No results found for this or any previous visit from the past 1095 days.      Stress Test:  No results found for this or any previous visit from the past 1095 days.      Cardiac Imaging:  No results found for this or any previous visit from the past 1095 days.          Assessment/Plan   In summary Mr. Garrett is a 55-year-old gentleman with paroxysmal atrial fibrillation and chronic diastolic heart failure.     1-paroxysmal atrial fibrillation-he is in sinus rhythm today. He is on metoprolol for heart rate control. He is chronically anticoagulated and with stable hemoglobin and hematocrit. He could not tolerate sotalol due to interaction with his necessary psychiatric medications. We advised him to check a BMP and CBC on a regular basis, 6 monthly.     2-chronic diastolic heart failure-reasonably well compensated. He is to monitor his weight regularly at the group home and notify us if he gains more than 5 pounds in a week. Continue lasix.  His blood pressure is elevated today.  Continue to monitor may have to add additional lesions if this remains over 130/80.  Previously with hyponatremia he has been put on fluid restriction.  Currently his sodium levels are somewhat elevated.  Advised caregiver to liberalize fluid a bit, perhaps additional 250 cc a day.        3- triglyceridemia-has improved with diabetes control.    Overall he is doing well.  Recommend follow-up in 6 months with our LARRY and in a year with me.  We will reevaluate him earlier if needed.  His blood pressure is mildly decreased today but generally well-controlled at baseline.  At this time no specific recommendation other " than increasing fluid intake by 250 cc/day      Nish Gonzales MD

## 2025-02-21 LAB
ATRIAL RATE: 73 BPM
P AXIS: 61 DEGREES
P OFFSET: 207 MS
P ONSET: 150 MS
PR INTERVAL: 150 MS
Q ONSET: 225 MS
QRS COUNT: 12 BEATS
QRS DURATION: 82 MS
QT INTERVAL: 378 MS
QTC CALCULATION(BAZETT): 416 MS
QTC FREDERICIA: 403 MS
R AXIS: -25 DEGREES
T AXIS: -25 DEGREES
T OFFSET: 414 MS
VENTRICULAR RATE: 73 BPM

## 2025-03-03 DIAGNOSIS — H04.129 DRYNESS OF EYE: ICD-10-CM

## 2025-03-03 DIAGNOSIS — H21.89: ICD-10-CM

## 2025-03-03 RX ORDER — DEXTRAN 70, HYPROMELLOSE 2910 3; 1 MG/ML; MG/ML
SOLUTION/ DROPS OPHTHALMIC
Qty: 36 EACH | Refills: 0 | Status: SHIPPED | OUTPATIENT
Start: 2025-03-03

## 2025-04-01 ENCOUNTER — APPOINTMENT (OUTPATIENT)
Dept: OPHTHALMOLOGY | Facility: CLINIC | Age: 55
End: 2025-04-01
Payer: MEDICARE

## 2025-04-01 DIAGNOSIS — H33.22 RETINAL DETACHMENT, LEFT: Primary | ICD-10-CM

## 2025-04-01 DIAGNOSIS — H26.8 MATURE CATARACT: ICD-10-CM

## 2025-04-01 PROCEDURE — 99214 OFFICE O/P EST MOD 30 MIN: CPT | Performed by: OPHTHALMOLOGY

## 2025-04-01 ASSESSMENT — SLIT LAMP EXAM - LIDS
COMMENTS: NORMAL
COMMENTS: NORMAL

## 2025-04-01 ASSESSMENT — VISUAL ACUITY
METHOD: SNELLEN - LINEAR
OD_SC: FIX AND FOLLOW

## 2025-04-01 ASSESSMENT — ENCOUNTER SYMPTOMS
CONSTITUTIONAL NEGATIVE: 1
PSYCHIATRIC NEGATIVE: 1

## 2025-04-01 ASSESSMENT — CUP TO DISC RATIO: OD_RATIO: 4

## 2025-04-01 NOTE — PROGRESS NOTES
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 Worley  had complicated post op recovery after cataract surgery in the left eye done in 11/2023, eye rubbing taking off the shield. Last time was seen by his surgeon was in 09/2023 in which she documented scarring in the posterior capsule obscuring view to the posterior chamber.     Presented Last month with new onset redness in the left eye. On exam there was +1-2 cells, extensive rubeosis and VH.     On today's exam, ciliary injection is better after using Pred with weekly taper, less VH but there is retinal detachment (RD) on B-scan (seems old)    Plan:    Consider cataract surgery OD no retina contraindications  Follow up 2 weeks after intraocular lens (IOL) OD  Use Artificial tears TID in both eyes.       Patient is here with care giver, any surgery decision needs to be discussed with his Guardian (Legal appointed guardian)

## 2025-04-14 ENCOUNTER — LAB REQUISITION (OUTPATIENT)
Dept: LAB | Facility: HOSPITAL | Age: 55
End: 2025-04-14
Payer: MEDICARE

## 2025-04-14 DIAGNOSIS — Z11.59 ENCOUNTER FOR SCREENING FOR OTHER VIRAL DISEASES: ICD-10-CM

## 2025-04-14 DIAGNOSIS — K76.9 LIVER DISEASE, UNSPECIFIED: ICD-10-CM

## 2025-04-14 DIAGNOSIS — Z11.4 ENCOUNTER FOR SCREENING FOR HUMAN IMMUNODEFICIENCY VIRUS (HIV): ICD-10-CM

## 2025-04-14 LAB
ALBUMIN SERPL BCP-MCNC: 4 G/DL (ref 3.4–5)
ALP SERPL-CCNC: 88 U/L (ref 33–120)
ALT SERPL W P-5'-P-CCNC: 16 U/L (ref 10–52)
AST SERPL W P-5'-P-CCNC: 15 U/L (ref 9–39)
BILIRUB DIRECT SERPL-MCNC: 0.1 MG/DL (ref 0–0.3)
BILIRUB SERPL-MCNC: 0.3 MG/DL (ref 0–1.2)
HAV IGM SER QL: NONREACTIVE
HBV CORE IGM SER QL: NONREACTIVE
HBV SURFACE AG SERPL QL IA: NONREACTIVE
HCV AB SER QL: NONREACTIVE
HIV 1+2 AB+HIV1 P24 AG SERPL QL IA: NONREACTIVE
PROT SERPL-MCNC: 6.4 G/DL (ref 6.4–8.2)

## 2025-04-14 PROCEDURE — 80076 HEPATIC FUNCTION PANEL: CPT | Mod: OUT | Performed by: STUDENT IN AN ORGANIZED HEALTH CARE EDUCATION/TRAINING PROGRAM

## 2025-04-14 PROCEDURE — 87389 HIV-1 AG W/HIV-1&-2 AB AG IA: CPT | Mod: OUT,PORLAB | Performed by: STUDENT IN AN ORGANIZED HEALTH CARE EDUCATION/TRAINING PROGRAM

## 2025-04-14 PROCEDURE — 36415 COLL VENOUS BLD VENIPUNCTURE: CPT | Mod: OUT | Performed by: STUDENT IN AN ORGANIZED HEALTH CARE EDUCATION/TRAINING PROGRAM

## 2025-04-14 PROCEDURE — 80074 ACUTE HEPATITIS PANEL: CPT | Mod: OUT,PORLAB | Performed by: STUDENT IN AN ORGANIZED HEALTH CARE EDUCATION/TRAINING PROGRAM

## 2025-08-18 ENCOUNTER — LAB REQUISITION (OUTPATIENT)
Dept: LAB | Facility: HOSPITAL | Age: 55
End: 2025-08-18
Payer: MEDICARE

## 2025-08-18 DIAGNOSIS — Z51.81 ENCOUNTER FOR THERAPEUTIC DRUG LEVEL MONITORING: ICD-10-CM

## 2025-08-18 DIAGNOSIS — E72.20 DISORDER OF UREA CYCLE METABOLISM, UNSPECIFIED (MULTI): ICD-10-CM

## 2025-08-18 DIAGNOSIS — K76.9 LIVER DISEASE, UNSPECIFIED: ICD-10-CM

## 2025-08-18 DIAGNOSIS — R73.09 OTHER ABNORMAL GLUCOSE: ICD-10-CM

## 2025-08-18 LAB
ALBUMIN SERPL BCP-MCNC: 3.8 G/DL (ref 3.4–5)
ALP SERPL-CCNC: 67 U/L (ref 33–120)
ALT SERPL W P-5'-P-CCNC: 13 U/L (ref 10–52)
AMMONIA PLAS-SCNC: 45 UMOL/L (ref 16–53)
AST SERPL W P-5'-P-CCNC: 12 U/L (ref 9–39)
BILIRUB DIRECT SERPL-MCNC: 0.1 MG/DL (ref 0–0.3)
BILIRUB SERPL-MCNC: 0.3 MG/DL (ref 0–1.2)
EST. AVERAGE GLUCOSE BLD GHB EST-MCNC: 128 MG/DL
HBA1C MFR BLD: 6.1 % (ref ?–5.7)
PROT SERPL-MCNC: 6.2 G/DL (ref 6.4–8.2)
VALPROATE SERPL-MCNC: 72 UG/ML (ref 50–100)

## 2025-08-18 PROCEDURE — 80164 ASSAY DIPROPYLACETIC ACD TOT: CPT | Mod: OUT | Performed by: STUDENT IN AN ORGANIZED HEALTH CARE EDUCATION/TRAINING PROGRAM

## 2025-08-18 PROCEDURE — 36415 COLL VENOUS BLD VENIPUNCTURE: CPT | Mod: OUT | Performed by: STUDENT IN AN ORGANIZED HEALTH CARE EDUCATION/TRAINING PROGRAM

## 2025-08-18 PROCEDURE — 80076 HEPATIC FUNCTION PANEL: CPT | Mod: OUT | Performed by: STUDENT IN AN ORGANIZED HEALTH CARE EDUCATION/TRAINING PROGRAM

## 2025-08-18 PROCEDURE — 82140 ASSAY OF AMMONIA: CPT | Mod: OUT | Performed by: STUDENT IN AN ORGANIZED HEALTH CARE EDUCATION/TRAINING PROGRAM

## 2025-08-18 PROCEDURE — 83036 HEMOGLOBIN GLYCOSYLATED A1C: CPT | Mod: OUT,PORLAB | Performed by: STUDENT IN AN ORGANIZED HEALTH CARE EDUCATION/TRAINING PROGRAM

## 2025-08-20 ENCOUNTER — TELEPHONE (OUTPATIENT)
Dept: CARDIOLOGY | Facility: HOSPITAL | Age: 55
End: 2025-08-20
Payer: MEDICARE

## 2025-08-26 ENCOUNTER — APPOINTMENT (OUTPATIENT)
Dept: CARDIOLOGY | Facility: HOSPITAL | Age: 55
End: 2025-08-26
Payer: MEDICARE

## (undated) DEVICE — GLOVE SURG SZ 65 THK91MIL LTX FREE SYN POLYISOPRENE

## (undated) DEVICE — DENTAL: Brand: MEDLINE INDUSTRIES, INC.

## (undated) DEVICE — PENCIL ES L3M BTTN SWCH HOLSTER W/ BLDE ELECTRD EDGE

## (undated) DEVICE — GLOVE ORANGE PI 8   MSG9080

## (undated) DEVICE — GOWN,SIRUS,FABRNF,L,20/CS: Brand: MEDLINE

## (undated) DEVICE — ELECTRODE PT RET AD L9FT HI MOIST COND ADH HYDRGEL CORDED

## (undated) DEVICE — ELECTRODE NDL TOT L2.13IN EXPOSED L3CM ACT L3MM TIP